# Patient Record
Sex: FEMALE | Race: AMERICAN INDIAN OR ALASKA NATIVE
[De-identification: names, ages, dates, MRNs, and addresses within clinical notes are randomized per-mention and may not be internally consistent; named-entity substitution may affect disease eponyms.]

---

## 2017-05-01 ENCOUNTER — HOSPITAL ENCOUNTER (EMERGENCY)
Dept: HOSPITAL 5 - ED | Age: 42
Discharge: HOME | End: 2017-05-01
Payer: MEDICAID

## 2017-05-01 VITALS — DIASTOLIC BLOOD PRESSURE: 78 MMHG | SYSTOLIC BLOOD PRESSURE: 124 MMHG

## 2017-05-01 DIAGNOSIS — R32: Primary | ICD-10-CM

## 2017-05-01 LAB
ALBUMIN SERPL-MCNC: 3.7 G/DL (ref 3.9–5)
ALBUMIN/GLOB SERPL: 0.8 %
ALP SERPL-CCNC: 89 UNITS/L (ref 35–129)
ALT SERPL-CCNC: 41 UNITS/L (ref 7–56)
ANION GAP SERPL CALC-SCNC: 17 MMOL/L
BASOPHILS NFR BLD AUTO: 0.4 % (ref 0–1.8)
BILIRUB SERPL-MCNC: 0.3 MG/DL (ref 0.1–1.2)
BILIRUB UR QL STRIP: (no result)
BLOOD UR QL VISUAL: (no result)
BUN SERPL-MCNC: 8 MG/DL (ref 7–17)
BUN/CREAT SERPL: 13.33 %
CALCIUM SERPL-MCNC: 9.4 MG/DL (ref 8.4–10.2)
CHLORIDE SERPL-SCNC: 99.5 MMOL/L (ref 98–107)
CO2 SERPL-SCNC: 25 MMOL/L (ref 22–30)
EOSINOPHIL NFR BLD AUTO: 1.6 % (ref 0–4.3)
GLUCOSE SERPL-MCNC: 71 MG/DL (ref 65–100)
HCT VFR BLD CALC: 38.3 % (ref 30.3–42.9)
HGB BLD-MCNC: 12.5 GM/DL (ref 10.1–14.3)
KETONES UR STRIP-MCNC: (no result) MG/DL
LEUKOCYTE ESTERASE UR QL STRIP: (no result)
LIPASE SERPL-CCNC: 30 UNITS/L (ref 13–60)
MCH RBC QN AUTO: 26 PG (ref 28–32)
MCHC RBC AUTO-ENTMCNC: 33 % (ref 30–34)
MCV RBC AUTO: 80 FL (ref 79–97)
NITRITE UR QL STRIP: (no result)
PH UR STRIP: 7 [PH] (ref 5–7)
PLATELET # BLD: 276 K/MM3 (ref 140–440)
POTASSIUM SERPL-SCNC: 4.2 MMOL/L (ref 3.6–5)
PROT SERPL-MCNC: 8.1 G/DL (ref 6.3–8.2)
PROT UR STRIP-MCNC: (no result) MG/DL
RBC # BLD AUTO: 4.76 M/MM3 (ref 3.65–5.03)
RBC #/AREA URNS HPF: < 1 /HPF (ref 0–6)
SODIUM SERPL-SCNC: 137 MMOL/L (ref 137–145)
UROBILINOGEN UR-MCNC: < 2 MG/DL (ref ?–2)
WBC # BLD AUTO: 6.4 K/MM3 (ref 4.5–11)
WBC #/AREA URNS HPF: < 1 /HPF (ref 0–6)

## 2017-05-01 PROCEDURE — 36415 COLL VENOUS BLD VENIPUNCTURE: CPT

## 2017-05-01 PROCEDURE — 96361 HYDRATE IV INFUSION ADD-ON: CPT

## 2017-05-01 PROCEDURE — 96374 THER/PROPH/DIAG INJ IV PUSH: CPT

## 2017-05-01 PROCEDURE — 74176 CT ABD & PELVIS W/O CONTRAST: CPT

## 2017-05-01 PROCEDURE — 81001 URINALYSIS AUTO W/SCOPE: CPT

## 2017-05-01 PROCEDURE — 80053 COMPREHEN METABOLIC PANEL: CPT

## 2017-05-01 PROCEDURE — 83690 ASSAY OF LIPASE: CPT

## 2017-05-01 PROCEDURE — 99284 EMERGENCY DEPT VISIT MOD MDM: CPT

## 2017-05-01 PROCEDURE — 84703 CHORIONIC GONADOTROPIN ASSAY: CPT

## 2017-05-01 PROCEDURE — 51702 INSERT TEMP BLADDER CATH: CPT

## 2017-05-01 PROCEDURE — 85025 COMPLETE CBC W/AUTO DIFF WBC: CPT

## 2017-05-01 PROCEDURE — 87086 URINE CULTURE/COLONY COUNT: CPT

## 2017-05-01 NOTE — EMERGENCY DEPARTMENT REPORT
Entered by JOSE GAO, acting as scribe for MILAN ODONNELL PA.


Chief Complaint: Abdominal Pain


Stated Complaint: DIFFICULTY URINATING,BACK PAIN


Time Seen by Provider: 05/01/17 12:08





- HPI


History of Present Illness: 





40 y/o female presents c/o inability to void since 2 days ago. Sx include fever 

and chills. Pt notes Hx of kidney stones and kidney infection. 





- ROS


Review of Systems: 





as noted in HPI





- Exam


Vital Signs: 


 Vital Signs











  05/01/17





  12:00


 


Temperature 98.2 F


 


Pulse Rate 68


 


Respiratory 16





Rate 


 


Blood Pressure 150/100


 


O2 Sat by Pulse 100





Oximetry 











Physical Exam: 





General: 40 y/o female in no acute distress.  Well-developed, well-nourished.


CV: Regular rate and rhythm.  No murmurs rubs or gallops.


Lungs: Clear to auscultation bilaterally.


Abdomen: abd distended and TTP. No guarding. Normal bowel sounds. 


Mini Neuro: Alert and oriented 3.





MSE screening note: 


Focused history and physical exam performed.


Due to findings the following was ordered:











ED Disposition for MSE


Condition: Stable


Instructions:  Abdominal Pain (ED)





This documentation as recorded by the scribe,JOSE GAO,accurately reflects 

the service I personally performed and the decisions made by me,MILAN ODONNELL PA.

## 2017-05-01 NOTE — EMERGENCY DEPARTMENT REPORT
ED Female  HPI





- General


Chief complaint: Abdominal Pain


Stated complaint: DIFFICULTY URINATING,BACK PAIN


Time Seen by Provider: 17 12:18


Source: patient


Mode of arrival: Ambulatory


Limitations: No Limitations





- History of Present Illness


Initial comments: 





Patient reports inability to urinate for approximately 2 days.  Reports hx of 

kidney stones.  reports that she had similar symptoms with kidney stones


MD Complaint: other (suprapubic pain)


-: Gradual, days(s)


Location: suprapubic


Radiation: non-radiating


Severity: mild


Severity scale (0 -10): 3


Quality: aching


Consistency: constant


Improves with: none


Worsens with: none


Are you Pregnant Now?: No


Associated Symptoms: abdominal pain.  denies: vaginal discharge, vaginal 

bleeding, nausea/vomiting, fever/chills, headaches, loss of appetite, dysuria, 

hematuria, rash, seizure, shortness of breath, syncope, weakness





- Related Data


 Home Medications











 Medication  Instructions  Recorded  Confirmed  Last Taken


 


No Known Home Medications [No  17 Unknown





Reported Home Medications]    











 Allergies











Allergy/AdvReac Type Severity Reaction Status Date / Time


 


No Known Allergies Allergy   Verified 17 15:38














ED Review of Systems


ROS: 


Stated complaint: DIFFICULTY URINATING,BACK PAIN


Other details as noted in HPI





Other: 





GENERAL: No weight change, fatigue, weakness, fever, chills, or night sweats


SKIN: No changes in skin or hair, no itching, no rashes, no jaundice


HEAD: No trauma, headache, or visual changes


EYES: No blurriness, tearing, itching, acute visual loss, conjunctival 

discoloration, or scleral icterus


EARS: No hearing loss,  tinnitus, vertigo, or earache


NOSE: No rhinorrhea, stuffiness, sneezing, itching, or epistaxis


MOUTH: No bleeding gums, hoarseness, sore throat, or swelling


CARDIAC: No new murmur, chest pain, palpitations, dyspnea on exertion, orthopnea

, PND, or edema


RESPIRATORY: No shortness of breath, wheeze, cough, sputum production, 

hemoptysis, pneumonia, asthma, bronchitis, or emphysema


GI: No change in appetite, vomiting,  dysphagia, change in bowel frequency, 

diarrhea, constipation, bleeding, hematemesis, melena, hematochezia


MUSCULOSKELETAL: No muscle weakness, joint stiffness, decrease in range of 

motion, redness, swelling, tenderness


NEUROLOGIC: No loss of sensation, numbness, tingling, tremors, weakness, 

paralysis, seizures


HEMATOLOGIC: No anemia, easy bruising, bleeding, petechiae, or purpura


ENDOCRINE: No hot or cold intolerance, sweating, polyuria, polydipsia or, 

polyphagia no thyroid problems


PSYCHIATRIC: No change in mood, no anxiety, no depression














ED Past Medical Hx





- Past Medical History


Hx Heart Attack/AMI: No


Hx Congestive Heart Failure: No


Hx Diabetes: No


Hx Kidney Stones: Yes


Hx Asthma: No


Hx COPD: No





- Surgical History


Hx Cholecystectomy: Yes


Additional Surgical History: TUBAL LIGATION AND REVERSAL.  .  KIDNEY 

STONE REMOVED





- Social History


Smoking Status: Never Smoker


Substance Use Type: Alcohol





- Medications


Home Medications: 


 Home Medications











 Medication  Instructions  Recorded  Confirmed  Last Taken  Type


 


No Known Home Medications [No  17 Unknown History





Reported Home Medications]     














ED Physical Exam





- General


Limitations: No Limitations





- Other


Other exam information: 





GENERAL: Patient in no acute distress


HEAD: Normocephalic, atraumatic


EYES: PERRLA, EOM intact, no scleral icterus, no papilledema, no conjunctival 

hemorrhage, visual fields and acuity wnl,


NOSE: No tenderness, discharge, sinus tenderness


MOUTH: No erythema, bleeding, exudate


HEART: Regular rate and rhythm, no murmur, S1-S2 are auscultated, pulses are 

symmetric


LUNGS: No wheezing, rales, rhonchi, bilateral breath sounds


ABDOMEN: Normal bowel sounds, no tenderness, no rebound, no guarding, no masses

, no CVA tenderness


MUSCULOSKELETAL: Normal joint range of motion, no redness, no swelling, no 

tenderness


NEUROLOGIC: GCS 15, Alert and Oriented x3, Cranial nerves intact, normal 

sensation, normal strength, normal gait, no cerebellar deficit


PSYCHIATRIC: No homicidal or suicidal ideation, no anxiety, no depression, no 

hallucinations


SKIN: Skin is warm and dry, no wounds, no rashes




















ED Course


 Vital Signs











  17





  12:00 16:37 17:05


 


Temperature 98.2 F  


 


Pulse Rate 68  


 


Respiratory 16 18 18





Rate   


 


Blood Pressure 150/100  


 


Blood Pressure   





[Left]   


 


O2 Sat by Pulse 100  100





Oximetry   














  17





  17:29


 


Temperature 


 


Pulse Rate 68


 


Respiratory 18





Rate 


 


Blood Pressure 


 


Blood Pressure 124/78





[Left] 


 


O2 Sat by Pulse 100





Oximetry 














ED Medical Decision Making





- Lab Data


Result diagrams: 


 17 12:12





 17 12:12





- Radiology Data


Radiology results: report reviewed





- Medical Decision Making





Patient comfortable.  Updated with results.  Plan discharge with outpatient 

follow-up.  Patient agrees with plan and will return if symptoms worsen.


Critical care attestation.: 


If time is entered above; I have spent that time in minutes in the direct care 

of this critically ill patient, excluding procedure time.








ED Disposition


Clinical Impression: 


Urinary bladder incontinence


Qualifiers:


 Urinary Incontinence type: unspecified incontinence Qualified Code(s): R32 - 

Unspecified urinary incontinence





Disposition: DISCHARGED TO HOME OR SELFCARE


Is pt being admited?: No


Condition: Stable


Instructions:  Urinary Incontinence (ED), Abdominal Pain (ED)


Referrals: 


PRIMARY CARE,MD [Primary Care Provider] - 3-5 Days


HELGA BOYD MD [Staff Physician] - ASAP


Forms:  Work/School Release Form(ED)


Time of Disposition: 18:01

## 2017-05-01 NOTE — CAT SCAN REPORT
CT of the abdomen and pelvis without contrast.



History: Flank pain.



Findings: There are 3 stones in the right kidney, the largest of which 

measures 4 mm in diameter. A 3.8 center in diameter cyst is seen in the 

upper pole of the right kidney. A 1 cm cyst is in the lower pole of the 

right kidney. 3 tiny stones measuring 2 mm each are seen in the left 

kidney. There is no evidence of hydronephrosis or ureteral dilatation.



The abdominal viscera are otherwise unremarkable. No abnormal fluid 

collections or mesenteric inflammation is seen.



Impression: Bilateral nephrolithiasis without obstruction.



2. 2 right renal cysts are described.

## 2017-05-12 ENCOUNTER — HOSPITAL ENCOUNTER (EMERGENCY)
Dept: HOSPITAL 5 - ED | Age: 42
Discharge: HOME | End: 2017-05-12
Payer: MEDICAID

## 2017-05-12 VITALS — DIASTOLIC BLOOD PRESSURE: 71 MMHG | SYSTOLIC BLOOD PRESSURE: 118 MMHG

## 2017-05-12 DIAGNOSIS — Z96.89: ICD-10-CM

## 2017-05-12 DIAGNOSIS — R10.32: ICD-10-CM

## 2017-05-12 DIAGNOSIS — E86.0: ICD-10-CM

## 2017-05-12 DIAGNOSIS — G43.909: ICD-10-CM

## 2017-05-12 DIAGNOSIS — N13.2: ICD-10-CM

## 2017-05-12 DIAGNOSIS — R31.9: Primary | ICD-10-CM

## 2017-05-12 LAB
ALBUMIN SERPL-MCNC: 3.7 G/DL (ref 3.9–5)
ALBUMIN/GLOB SERPL: 1 %
ALP SERPL-CCNC: 78 UNITS/L (ref 35–129)
ALT SERPL-CCNC: 25 UNITS/L (ref 7–56)
ANION GAP SERPL CALC-SCNC: 15 MMOL/L
BASOPHILS NFR BLD AUTO: 0.8 % (ref 0–1.8)
BILIRUB SERPL-MCNC: 0.2 MG/DL (ref 0.1–1.2)
BILIRUB UR QL STRIP: (no result)
BLOOD UR QL VISUAL: (no result)
BUN SERPL-MCNC: 13 MG/DL (ref 7–17)
BUN/CREAT SERPL: 18.57 %
CALCIUM SERPL-MCNC: 8.7 MG/DL (ref 8.4–10.2)
CHLORIDE SERPL-SCNC: 102.6 MMOL/L (ref 98–107)
CO2 SERPL-SCNC: 25 MMOL/L (ref 22–30)
EOSINOPHIL NFR BLD AUTO: 0.4 % (ref 0–4.3)
GLUCOSE SERPL-MCNC: 85 MG/DL (ref 65–100)
HCT VFR BLD CALC: 35.9 % (ref 30.3–42.9)
HGB BLD-MCNC: 11.7 GM/DL (ref 10.1–14.3)
KETONES UR STRIP-MCNC: (no result) MG/DL
LEUKOCYTE ESTERASE UR QL STRIP: (no result)
LIPASE SERPL-CCNC: 30 UNITS/L (ref 13–60)
MCH RBC QN AUTO: 27 PG (ref 28–32)
MCHC RBC AUTO-ENTMCNC: 33 % (ref 30–34)
MCV RBC AUTO: 81 FL (ref 79–97)
MUCOUS THREADS #/AREA URNS HPF: (no result) /HPF
NITRITE UR QL STRIP: (no result)
PH UR STRIP: 6 [PH] (ref 5–7)
PLATELET # BLD: 351 K/MM3 (ref 140–440)
POTASSIUM SERPL-SCNC: 4.4 MMOL/L (ref 3.6–5)
PROT SERPL-MCNC: 7.3 G/DL (ref 6.3–8.2)
RBC # BLD AUTO: 4.43 M/MM3 (ref 3.65–5.03)
RBC #/AREA URNS HPF: > 182 /HPF (ref 0–6)
SODIUM SERPL-SCNC: 138 MMOL/L (ref 137–145)
URINE DRUGS OF ABUSE NOTE: (no result)
UROBILINOGEN UR-MCNC: < 2 MG/DL (ref ?–2)
WBC # BLD AUTO: 11.8 K/MM3 (ref 4.5–11)
WBC #/AREA URNS HPF: > 182 /HPF (ref 0–6)

## 2017-05-12 PROCEDURE — 85025 COMPLETE CBC W/AUTO DIFF WBC: CPT

## 2017-05-12 PROCEDURE — 84703 CHORIONIC GONADOTROPIN ASSAY: CPT

## 2017-05-12 PROCEDURE — 96361 HYDRATE IV INFUSION ADD-ON: CPT

## 2017-05-12 PROCEDURE — 36415 COLL VENOUS BLD VENIPUNCTURE: CPT

## 2017-05-12 PROCEDURE — 87086 URINE CULTURE/COLONY COUNT: CPT

## 2017-05-12 PROCEDURE — 51701 INSERT BLADDER CATHETER: CPT

## 2017-05-12 PROCEDURE — 80053 COMPREHEN METABOLIC PANEL: CPT

## 2017-05-12 PROCEDURE — 99284 EMERGENCY DEPT VISIT MOD MDM: CPT

## 2017-05-12 PROCEDURE — 74176 CT ABD & PELVIS W/O CONTRAST: CPT

## 2017-05-12 PROCEDURE — 96375 TX/PRO/DX INJ NEW DRUG ADDON: CPT

## 2017-05-12 PROCEDURE — 81001 URINALYSIS AUTO W/SCOPE: CPT

## 2017-05-12 PROCEDURE — 80307 DRUG TEST PRSMV CHEM ANLYZR: CPT

## 2017-05-12 PROCEDURE — 83690 ASSAY OF LIPASE: CPT

## 2017-05-12 PROCEDURE — 96376 TX/PRO/DX INJ SAME DRUG ADON: CPT

## 2017-05-12 PROCEDURE — 96365 THER/PROPH/DIAG IV INF INIT: CPT

## 2017-05-12 NOTE — EMERGENCY DEPARTMENT REPORT
ED Abdominal Pain HPI





- General


Chief Complaint: Abdominal Pain


Stated Complaint: ABD PAIN


Time Seen by Provider: 17 06:21


Source: patient, EMS


Mode of arrival: Stretcher


Limitations: No Limitations





- History of Present Illness


Initial Comments: 


Patient had a stent placed on 5/10 of 2017.  At 10 AM this morning she noted 

gross hematuria.  She's been having some right flank pain and left lower 

quadrant pain.  She denies fever or chills.  She was given Dilaudid prior to my 

examination for analgesia.  She reported persistent pain.  She has not been 

vomiting.





MD Complaint: abdominal pain


-: Gradual, hour(s)


Location: LLQ


Radiation: none


Migration to: no migration


Severity scale (0 -10): 8


Quality: cramping


Consistency: constant


Improves With: nothing


Worsens With: nothing


Associated Symptoms: denies other symptoms





- Related Data


 Home Medications











 Medication  Instructions  Recorded  Confirmed  Last Taken


 


HYDROcodone/APAP 5-325 [[place] 1 each PO Q6HR PRN 17





5/325]    








 Previous Rx's











 Medication  Instructions  Recorded  Last Taken  Type


 


Cefuroxime [Ceftin] 250 mg PO Q12H #14 tablet 17 Unknown Rx


 


oxyCODONE /ACETAMINOPHEN [Percocet 1 tab PO Q6HR PRN #14 tablet 17 

Unknown Rx





5/325]    











 Allergies











Allergy/AdvReac Type Severity Reaction Status Date / Time


 


No Known Allergies Allergy   Verified 17 15:38














ED Review of Systems


ROS: 


Stated complaint: ABD PAIN


Other details as noted in HPI





Constitutional: denies: chills, fever


Eyes: denies: eye pain, eye discharge, vision change


ENT: denies: ear pain, throat pain


Respiratory: denies: cough, shortness of breath, wheezing


Cardiovascular: denies: chest pain, palpitations


Endocrine: no symptoms reported


Gastrointestinal: as per HPI, abdominal pain.  denies: nausea, diarrhea


Genitourinary: hematuria.  denies: urgency, dysuria, discharge


Musculoskeletal: denies: back pain, joint swelling, arthralgia


Skin: denies: rash, lesions


Neurological: denies: headache, weakness, paresthesias


Psychiatric: denies: anxiety, depression


Hematological/Lymphatic: denies: easy bleeding, easy bruising





ED Past Medical Hx





- Past Medical History


Hx Hypertension: No


Hx Heart Attack/AMI: No


Hx Congestive Heart Failure: No


Hx Diabetes: No


Hx Renal Disease: Yes (kidney stones)


Hx Headaches / Migraines: Yes (MIGRAINES)


Hx Kidney Stones: Yes


Hx Asthma: No


Hx COPD: No


Additional medical history: As far as I can tell there were no stones found on 

retrograde urethrogram prior to left ureteral stent.  There was some edema 

noted.  There are no stones in the left kidney per prior CT examination.





- Surgical History


Hx Cholecystectomy: Yes


Additional Surgical History: TUBAL LIGATION AND REVERSAL.  .  KIDNEY 

STONE REMOVED.  urethral stents





- Social History


Smoking Status: Never Smoker


Substance Use Type: None





- Medications


Home Medications: 


 Home Medications











 Medication  Instructions  Recorded  Confirmed  Last Taken  Type


 


HYDROcodone/APAP 5-325 [[place] 1 each PO Q6HR PRN 17 

History





5/325]     


 


Cefuroxime [Ceftin] 250 mg PO Q12H #14 tablet 17  Unknown Rx


 


oxyCODONE /ACETAMINOPHEN [Percocet 1 tab PO Q6HR PRN #14 tablet 17  

Unknown Rx





5/325]     














ED Physical Exam





- General


Limitations: No Limitations


General appearance: alert, in no apparent distress





- Head


Head exam: Present: atraumatic, normocephalic





- Eye


Eye exam: Present: normal appearance.  Absent: scleral icterus





- ENT


ENT exam: Present: normal exam, mucous membranes moist





- Neck


Neck exam: Present: normal inspection





- Respiratory


Respiratory exam: Present: normal lung sounds bilaterally.  Absent: respiratory 

distress





- Cardiovascular


Cardiovascular Exam: Present: regular rate, normal rhythm.  Absent: systolic 

murmur, diastolic murmur, rubs, gallop





- GI/Abdominal


GI/Abdominal exam: Present: soft, normal bowel sounds.  Absent: distended, 

tenderness, guarding, rebound, rigid





- Extremities Exam


Extremities exam: Present: normal inspection





- Back Exam


Back exam: Present: normal inspection





- Neurological Exam


Neurological exam: Present: alert, oriented X3, CN II-XII intact.  Absent: 

motor sensory deficit





- Psychiatric


Psychiatric exam: Present: normal affect, normal mood





- Skin


Skin exam: Present: warm, dry, intact, normal color.  Absent: rash





ED Course





 Vital Signs











  17





  04:18 04:20 04:27


 


Temperature   98.6 F


 


Pulse Rate  56 L 66


 


Respiratory  16 12





Rate   


 


Blood Pressure  181/85 181/85


 


Blood Pressure   181/85





[Right]   


 


O2 Sat by Pulse 98 100 100





Oximetry   














  17





  04:31 04:41 04:51


 


Temperature   


 


Pulse Rate 60 63 52 L


 


Respiratory 12 16 14





Rate   


 


Blood Pressure 146/86 146/86 134/73


 


Blood Pressure   





[Right]   


 


O2 Sat by Pulse 100 98 99





Oximetry   














  17





  05:00 05:11 05:21


 


Temperature   


 


Pulse Rate 54 L 61 55 L


 


Respiratory 8 L 9 L 19





Rate   


 


Blood Pressure 130/79 130/79 140/83


 


Blood Pressure   





[Right]   


 


O2 Sat by Pulse 99 96 98





Oximetry   














  17





  05:41 05:47 05:51


 


Temperature   


 


Pulse Rate 64 61 61


 


Respiratory  20 8 L





Rate   


 


Blood Pressure   150/67


 


Blood Pressure  151/67 





[Right]   


 


O2 Sat by Pulse 99 98 97





Oximetry   














  17





  06:00 06:11 07:00


 


Temperature   


 


Pulse Rate 142 H 53 L 61


 


Respiratory 33 H 17 13





Rate   


 


Blood Pressure 135/77 135/77 


 


Blood Pressure   122/87





[Right]   


 


O2 Sat by Pulse 96 95 98





Oximetry   














  17





  07:30 08:00 08:45


 


Temperature   


 


Pulse Rate  64 70


 


Respiratory 17 17 14





Rate   


 


Blood Pressure   


 


Blood Pressure  122/61 112/68





[Right]   


 


O2 Sat by Pulse  95 98





Oximetry   














  17





  08:52 09:22 09:40


 


Temperature   


 


Pulse Rate   65


 


Respiratory 14 12 15





Rate   


 


Blood Pressure   


 


Blood Pressure   124/73





[Right]   


 


O2 Sat by Pulse   95





Oximetry   














- Reevaluation(s)


Reevaluation #1: 


Patient's pain improved with additional Dilaudid.  However did not resolve.  

Gave the patient ceftriaxone and culture the urine.  I spoke to . he 

recommended that the patient come to the office for possible stent removal.  I 

will give the patient a prescription for Ceftin.


17 10:10








ED Medical Decision Making





- Lab Data


Result diagrams: 


 17 04:29





 17 04:29








 Laboratory Results - last 24 hr











  17





  04:29 04:29 04:29


 


WBC  11.8 H  


 


RBC  4.43  


 


Hgb  11.7  


 


Hct  35.9  


 


MCV  81  


 


MCH  27 L  


 


MCHC  33  


 


RDW  17.0 H  


 


Plt Count  351  


 


Lymph % (Auto)  30.6  


 


Mono % (Auto)  7.3  


 


Eos % (Auto)  0.4  


 


Baso % (Auto)  0.8  


 


Lymph #  3.6  


 


Mono #  0.9 H  


 


Eos #  0.0  


 


Baso #  0.1  


 


Seg Neutrophils %  60.9  


 


Seg Neutrophils #  7.2  


 


Sodium   138 


 


Potassium   4.4 


 


Chloride   102.6 


 


Carbon Dioxide   25 


 


Anion Gap   15 


 


BUN   13 


 


Creatinine   0.7 


 


Estimated GFR   > 60 


 


BUN/Creatinine Ratio   18.57 


 


Glucose   85 


 


Calcium   8.7 


 


Total Bilirubin   0.20 


 


AST   36 


 


ALT   25 


 


Alkaline Phosphatase   78 


 


Total Protein   7.3 


 


Albumin   3.7 L 


 


Albumin/Globulin Ratio   1.0 


 


Lipase   30 


 


HCG, Qual    Negative


 


Urine Color   


 


Urine Turbidity   


 


Urine pH   


 


Ur Specific Gravity   


 


Urine Protein   


 


Urine Glucose (UA)   


 


Urine Ketones   


 


Urine Blood   


 


Urine Nitrite   


 


Urine Bilirubin   


 


Urine Urobilinogen   


 


Ur Leukocyte Esterase   


 


Urine WBC (Auto)   


 


Urine RBC (Auto)   


 


U Epithel Cells (Auto)   


 


Urine WBC Clumps   


 


Urine Mucus   


 


Urine Opiates Screen   


 


Urine Methadone Screen   


 


Ur Barbiturates Screen   


 


Ur Phencyclidine Scrn   


 


Ur Amphetamines Screen   


 


U Benzodiazepines Scrn   


 


Urine Cocaine Screen   


 


U Marijuana (THC) Screen   


 


Drugs of Abuse Note   














  17





  06:42 06:42


 


WBC  


 


RBC  


 


Hgb  


 


Hct  


 


MCV  


 


MCH  


 


MCHC  


 


RDW  


 


Plt Count  


 


Lymph % (Auto)  


 


Mono % (Auto)  


 


Eos % (Auto)  


 


Baso % (Auto)  


 


Lymph #  


 


Mono #  


 


Eos #  


 


Baso #  


 


Seg Neutrophils %  


 


Seg Neutrophils #  


 


Sodium  


 


Potassium  


 


Chloride  


 


Carbon Dioxide  


 


Anion Gap  


 


BUN  


 


Creatinine  


 


Estimated GFR  


 


BUN/Creatinine Ratio  


 


Glucose  


 


Calcium  


 


Total Bilirubin  


 


AST  


 


ALT  


 


Alkaline Phosphatase  


 


Total Protein  


 


Albumin  


 


Albumin/Globulin Ratio  


 


Lipase  


 


HCG, Qual  


 


Urine Color  Red 


 


Urine Turbidity  Cloudy 


 


Urine pH  6.0 


 


Ur Specific Gravity  1.017 


 


Urine Protein  100 mg/dl 


 


Urine Glucose (UA)  Neg 


 


Urine Ketones  Neg 


 


Urine Blood  Lg 


 


Urine Nitrite  Neg 


 


Urine Bilirubin  Neg 


 


Urine Urobilinogen  < 2.0 


 


Ur Leukocyte Esterase  Mod 


 


Urine WBC (Auto)  > 182.0 H 


 


Urine RBC (Auto)  > 182.0 


 


U Epithel Cells (Auto)  7.0 


 


Urine WBC Clumps  3+ 


 


Urine Mucus  Few 


 


Urine Opiates Screen   Presumptive positive


 


Urine Methadone Screen   Presumptive negative


 


Ur Barbiturates Screen   Presumptive negative


 


Ur Phencyclidine Scrn   Presumptive negative


 


Ur Amphetamines Screen   Presumptive negative


 


U Benzodiazepines Scrn   Presumptive negative


 


Urine Cocaine Screen   Presumptive negative


 


U Marijuana (THC) Screen   Presumptive negative


 


Drugs of Abuse Note   Disclamer











Critical care attestation.: 


If time is entered above; I have spent that time in minutes in the direct care 

of this critically ill patient, excluding procedure time.








ED Disposition


Clinical Impression: 


 History of ureter stent





UTI (urinary tract infection)


Qualifiers:


 Urinary tract infection type: site unspecified Hematuria presence: with 

hematuria Qualified Code(s): N39.0 - Urinary tract infection, site not specified

; R31.9 - Hematuria, unspecified





Abdominal pain


Qualifiers:


 Abdominal location: left lower quadrant Qualified Code(s): R10.32 - Left lower 

quadrant pain





Disposition: DISCHARGED TO HOME OR SELFCARE


Is pt being admited?: No


Does the pt Need Aspirin: No


Condition: Stable


Instructions:  Abdominal Pain (ED), Urinary Tract Infection in Women (ED)


Additional Instructions: 


Dr. PLAZA will be expecting you in his office.  He may go there directly.  Rx for 

UTI.  Follow-up on urine culture.


Prescriptions: 


Cefuroxime [Ceftin] 250 mg PO Q12H #14 tablet


oxyCODONE /ACETAMINOPHEN [Percocet 5/325] 1 tab PO Q6HR PRN #14 tablet


 PRN Reason: Pain


Referrals: 


PRIMARY CARE,MD [Primary Care Provider] - 3-5 Days


HELGA BOYD MD [Staff Physician] - ASAP


Time of Disposition: 10:14

## 2017-05-12 NOTE — CAT SCAN REPORT
FINAL REPORT



EXAM:  CT ABDOMEN PELVIS WO CON



HISTORY:  abd pain, urethral stent insertion 2 days ago 



TECHNIQUE:  Noncontrast CT of the abdomen and pelvis performed. 

No IV or gastrointestinal contrast was administered. Coronal and

sagittal reformatted images were obtained.





PRIORS:  5/1/2017



FINDINGS:  

There are several small nonobstructing right intrarenal calculi.

There is a left ureteral stent extending from renal pelvis to the

bladder. Despite the presence of a ureteral stent, there is mild

left hydronephrosis. There is a small gas bubble within the left

kidney/collecting system which is probably postprocedural. 



There is an unchanged 3.8 cm renal cyst. 



There is no abdominal aortic aneurysm.

There is no evidence of intestinal obstruction.

The appendix is normal.

There is no free intraperitoneal air.





The bladder is unremarkable.

There is no abnormal pelvic mass or fluid collections seen. 



IMPRESSION:  

There is a left ureteral stent. Despite presence of the stent,

there is mild left hydronephrosis. Small gas bubble in the left

kidney is probably postprocedural.



Several small nonobstructing right intrarenal calculi seen.

## 2020-01-07 ENCOUNTER — HOSPITAL ENCOUNTER (OUTPATIENT)
Dept: HOSPITAL 5 - ED | Age: 45
Setting detail: OBSERVATION
LOS: 1 days | Discharge: HOME | End: 2020-01-08
Attending: INTERNAL MEDICINE | Admitting: INTERNAL MEDICINE
Payer: SELF-PAY

## 2020-01-07 DIAGNOSIS — Z87.442: ICD-10-CM

## 2020-01-07 DIAGNOSIS — I69.351: ICD-10-CM

## 2020-01-07 DIAGNOSIS — G43.809: Primary | ICD-10-CM

## 2020-01-07 DIAGNOSIS — E66.9: ICD-10-CM

## 2020-01-07 DIAGNOSIS — Z79.899: ICD-10-CM

## 2020-01-07 DIAGNOSIS — Z79.82: ICD-10-CM

## 2020-01-07 LAB
APTT BLD: 30.2 SEC. (ref 24.2–36.6)
BASOPHILS # (AUTO): 0 K/MM3 (ref 0–0.1)
BASOPHILS NFR BLD AUTO: 0.4 % (ref 0–1.8)
BUN SERPL-MCNC: 12 MG/DL (ref 7–17)
BUN/CREAT SERPL: 17 %
CALCIUM SERPL-MCNC: 8.8 MG/DL (ref 8.4–10.2)
EOSINOPHIL # BLD AUTO: 0.1 K/MM3 (ref 0–0.4)
EOSINOPHIL NFR BLD AUTO: 1.2 % (ref 0–4.3)
HCT VFR BLD CALC: 37.8 % (ref 30.3–42.9)
HEMOLYSIS INDEX: 5
HGB BLD-MCNC: 12.6 GM/DL (ref 10.1–14.3)
INR PPP: 0.98 (ref 0.87–1.13)
LYMPHOCYTES # BLD AUTO: 2.6 K/MM3 (ref 1.2–5.4)
LYMPHOCYTES NFR BLD AUTO: 34.3 % (ref 13.4–35)
MCHC RBC AUTO-ENTMCNC: 33 % (ref 30–34)
MCV RBC AUTO: 86 FL (ref 79–97)
MONOCYTES # (AUTO): 0.7 K/MM3 (ref 0–0.8)
MONOCYTES % (AUTO): 9.6 % (ref 0–7.3)
PLATELET # BLD: 252 K/MM3 (ref 140–440)
RBC # BLD AUTO: 4.4 M/MM3 (ref 3.65–5.03)

## 2020-01-07 PROCEDURE — 84484 ASSAY OF TROPONIN QUANT: CPT

## 2020-01-07 PROCEDURE — 93005 ELECTROCARDIOGRAM TRACING: CPT

## 2020-01-07 PROCEDURE — 99291 CRITICAL CARE FIRST HOUR: CPT

## 2020-01-07 PROCEDURE — 93306 TTE W/DOPPLER COMPLETE: CPT

## 2020-01-07 PROCEDURE — 70551 MRI BRAIN STEM W/O DYE: CPT

## 2020-01-07 PROCEDURE — 85025 COMPLETE CBC W/AUTO DIFF WBC: CPT

## 2020-01-07 PROCEDURE — 70498 CT ANGIOGRAPHY NECK: CPT

## 2020-01-07 PROCEDURE — 97161 PT EVAL LOW COMPLEX 20 MIN: CPT

## 2020-01-07 PROCEDURE — G0378 HOSPITAL OBSERVATION PER HR: HCPCS

## 2020-01-07 PROCEDURE — 70496 CT ANGIOGRAPHY HEAD: CPT

## 2020-01-07 PROCEDURE — 70450 CT HEAD/BRAIN W/O DYE: CPT

## 2020-01-07 PROCEDURE — 80061 LIPID PANEL: CPT

## 2020-01-07 PROCEDURE — 80048 BASIC METABOLIC PNL TOTAL CA: CPT

## 2020-01-07 PROCEDURE — 96376 TX/PRO/DX INJ SAME DRUG ADON: CPT

## 2020-01-07 PROCEDURE — 85610 PROTHROMBIN TIME: CPT

## 2020-01-07 PROCEDURE — 97165 OT EVAL LOW COMPLEX 30 MIN: CPT

## 2020-01-07 PROCEDURE — 36415 COLL VENOUS BLD VENIPUNCTURE: CPT

## 2020-01-07 PROCEDURE — 93880 EXTRACRANIAL BILAT STUDY: CPT

## 2020-01-07 PROCEDURE — 93010 ELECTROCARDIOGRAM REPORT: CPT

## 2020-01-07 PROCEDURE — 85670 THROMBIN TIME PLASMA: CPT

## 2020-01-07 PROCEDURE — 96374 THER/PROPH/DIAG INJ IV PUSH: CPT

## 2020-01-07 PROCEDURE — 96375 TX/PRO/DX INJ NEW DRUG ADDON: CPT

## 2020-01-07 PROCEDURE — 85730 THROMBOPLASTIN TIME PARTIAL: CPT

## 2020-01-07 PROCEDURE — 82962 GLUCOSE BLOOD TEST: CPT

## 2020-01-07 RX ADMIN — ACETAMINOPHEN PRN MG: 325 TABLET ORAL at 22:12

## 2020-01-07 NOTE — CAT SCAN REPORT
CT angio head



INDICATION / CLINICAL INFORMATION:

44 years Female; r/o stroke.



TECHNIQUE: Thin cut axial images obtained through the head during IV bolus contrast administration. S
agittal, coronal, and 3 plane MIP reconstructions performed by the technologist. NASCET type criteria
 used evaluate stenoses. Automated exposure control utilized for radiation reduction purposes.



COMPARISON: 

None available.



FINDINGS:



INTERNAL CAROTID ARTERIES: No significant narrowing appreciated.

VERTEBROBASILAR SYSTEM: No significant narrowing appreciated. Mild narrowing of the distal, nondomina
nt right vertebral artery suggested.



DISTAL BRANCHES: Distal branches of the anterior, middle, and posterior cerebral arteries are fairly 
symmetric in appearance and number. There may be mild narrowing in the P1 segment on the left-not fel
t to be significant.



ANEURYSM: None identified.



ADDITIONAL FINDINGS: Remainder of the surrounding soft tissues are grossly normal. 



IMPRESSION:

No significant stenosis appreciated on this CTA of the head.



Signer Name: Juancho Gustafson MD, III 

Signed: 1/7/2020 8:27 PM

 Work[facility] Name: [id number]

## 2020-01-07 NOTE — HISTORY AND PHYSICAL REPORT
History of Present Illness


Chief complaint: 





I feel we, right side


History of present illness: 


44-year-old female with migraine headache, nephrolithiasis, presents to ED for 

evaluation.  Patient was in her usual state of health around 1 AM this morning 

and subsequently went to work.  Patient states that she felt an acute onset of 

right arm and leg weakness, as well as difficulty speaking.  Patient is unsure 

of the onset of her symptoms.  Patient states that she finished her shift at 

work and proceeded to [facility] for further care and evaluation.  Patient transported 

to [facility] via private vehicle.  Patient seen and evaluated in the emergency 

department and upon arrival a code stroke was called.  Neurology team consulted.

 Tele-neurology consulted as well.  Patient deemed not a candidate for TPA.  

Patient placed in observation status and admitted to medical floor for medical 

stabilization and evaluation.  Patient initiated on stroke protocol.  Prior 

admission on 2/18/2017 reviewed.  No medication listed for reconciliation at the

time of admission.  Patient denies fever, chills, chest pain, palpitations, 

shortness of breath, bright red blood per rectum, skin rash, fall, syncope, 

productive cough, unilateral leg swelling, calf pain, prolonged travel, 

prolonged immobility, unilateral leg swelling, hemoptysis, individual/family 

history of DVT/PE/bleeding disorders/blood clotting disorder.





Past History


Past Medical History: migraines


Past Surgical History: No surgical history, Other (Reviewed)


Social history: single.  denies: smoking, alcohol abuse, prescription drug abuse


Family history: diabetes, hypertension





Medications and Allergies


                                    Allergies











Allergy/AdvReac Type Severity Reaction Status Date / Time


 


No Known Allergies Allergy   Verified 02/17/17 15:38











                                Home Medications











 Medication  Instructions  Recorded  Confirmed  Last Taken  Type


 


HYDROcodone/APAP 5-325 [[place] 1 each PO Q6HR PRN 05/04/17 05/12/17 05/12/17 

History





5/325]     


 


cefUROXime [Ceftin] 250 mg PO Q12H #14 tablet 05/12/17  Unknown Rx


 


oxyCODONE /ACETAMINOPHEN [Percocet 1 tab PO Q6HR PRN #14 tablet 05/12/17  

Unknown Rx





5/325]     











Active Meds: 


Active Medications





Acetaminophen (Tylenol)  650 mg PO Q4H PRN


   PRN Reason: Pain, Mild (1-3)


Aspirin (Aspirin)  325 mg PO QDAY AI


Atorvastatin Calcium (Lipitor)  40 mg PO QHS AI


Bisacodyl (Dulcolax)  10 mg MN QDAY PRN


   PRN Reason: Constipation


Magnesium Hydroxide (Milk Of Magnesia)  30 ml PO Q4H PRN


   PRN Reason: Constipation


Metoclopramide HCl (Reglan)  10 mg PO Q6H PRN


   PRN Reason: Nausea And Vomiting


Ondansetron HCl (Zofran)  4 mg IV Q8H PRN


   PRN Reason: Nausea And Vomiting


Promethazine HCl (Phenergan)  25 mg MN Q6H PRN


   PRN Reason: Nausea And Vomiting


Sodium Chloride (Sodium Chloride Flush Syringe 10 Ml)  10 ml IV PRN PRN


   PRN Reason: LINE FLUSH











Review of Systems


Constitutional: no weight loss, no weight gain, no chills, no night sweats


Ears, nose, mouth and throat: no ear discharge, no decreased hearing, no nose 

pain, no nasal congestion


Breasts: no mass


Cardiovascular: no chest pain, no orthopnea, no palpitations, no rapid/irregular

heart beat


Respiratory: no cough, no excessive sputum, no hemoptysis, no shortness of 

breath


Gastrointestinal: no nausea, no vomiting, no constipation


Genitourinary Female: no pelvic pain, no flank pain, no menorrhagia, no urgency,

no urge incontinence


Rectal: no pain, no incontinence, no bleeding


Musculoskeletal: no neck stiffness, no shooting arm pain, no arm numb

ness/tingling, no low back pain, no shooting leg pain, no leg numbness/tingling


Integumentary: no rash, no pruritis, no redness, no sores, no wounds


Neurological: weakness, numbness, lack of coordination, change in speech, no 

transient paralysis, no changes in smell/taste


Psychiatric: no anxiety, no memory loss, no sleep disturbances, no hypersomnia, 

no change in appetite, no change in libido


Endocrine: no cold intolerance, no polyphagia, no polydipsia, no polyuria


Hematologic/Lymphatic: no easy bruising, no lymphedema


Allergic/Immunologic: no urticaria, no allergic rhinitis, no anaphylaxis, no 

angioedema





Exam





- Constitutional


Vitals: 


                                        











Temp Pulse Resp BP Pulse Ox


 


    59 L  29 H  135/74   99 


 


    01/07/20 20:59  01/07/20 20:59  01/07/20 20:59  01/07/20 20:59











General appearance: Present: mild distress





- EENT


Eyes: Present: PERRL


ENT: hearing intact, clear oral mucosa





- Neck


Neck: Present: supple, normal ROM





- Respiratory


Respiratory effort: normal


Respiratory: bilateral: CTA





- Cardiovascular


Heart Sounds: Present: S1 & S2.  Absent: rub, click





- Extremities


Extremities: pulses symmetrical, No edema


Peripheral Pulses: within normal limits





- Abdominal


General gastrointestinal: Present: soft, non-tender, non-distended, normal bowel

sounds


Female genitourinary: Present: normal





- Integumentary


Integumentary: Present: clear, warm, dry





- Musculoskeletal


Musculoskeletal: right sided weakness





- Psychiatric


Psychiatric: appropriate mood/affect, intact judgment & insight





- Neurologic


Neurologic: CNII-XII intact, focal deficits, moves all extremities, no gait 

normal





Results





- Labs


CBC & Chem 7: 


                                 01/07/20 18:58





                                 01/07/20 18:58


Labs: 


                              Abnormal lab results











  01/07/20 01/07/20 Range/Units





  18:58 18:58 


 


Mono % (Auto)  9.6 H   (0.0-7.3)  %


 


Glucose   104 H  ()  mg/dL














Assessment and Plan





- Patient Problems


(1) CVA (cerebral vascular accident)


Status: Acute   


Qualifiers: 


   Precerebral and cerebral artery: middle cerebral artery   Laterality of 

affected vessel: left 


Plan to address problem: 


Stroke protocol: CT head, neurochecks, aspiration precautions, lipid panel, 

antiplatelet therapy, neurology consulted, carotid Doppler, echocardiogram, 

physical therapy, Occupational Therapy, speech therapy,








(2) Right hemiparesis


Status: Acute   


Plan to address problem: 


Physical therapy consulted.








(3) Obesity (BMI 30.0-34.9)


Status: Acute   


Plan to address problem: 


Balanced diet, increase physical activity at discharge








(4) DVT prophylaxis


Status: Acute   


Plan to address problem: 


SCD to bilateral lower extremity while in bed, patient ambulatory.

## 2020-01-07 NOTE — PROGRESS NOTE
Subjective


Date of service: 01/07/20


Interval history: 


patient seen in the CT scanner at time of the stroke alert  exam shows right 

sided sensory loss mild facial numbness and slight speech slowing  no gross 

motor weakness course of events from work til seen in ED a little bit hard to 

follw as she may have passed out  but no direct witnesses  at present appears 

grossly intat transfers on of stretcher very well personal review of the CT of 

head is normal  there is hx of headaches so migraine is possible but she is not 

on meds   recommend get ECHO if there is idea cardioembolic  hx is not well 

stated   w/u in ptogress  reviewed all notes








Objective





- Laboratory Findings


CBC and BMP: 


                                 01/07/20 18:58





Abnormal Lab Findings: 


                                  Abnormal Labs











  01/07/20





  18:58


 


Mono % (Auto)  9.6 H

## 2020-01-07 NOTE — HISTORY AND PHYSICAL REPORT
History of Present Illness


Date of examination: 01/07/20


Date of admission: 


01/07/2020


Chief complaint: 


R side numbness





Medications and Allergies


                                    Allergies











Allergy/AdvReac Type Severity Reaction Status Date / Time


 


No Known Allergies Allergy   Verified 02/17/17 15:38











                                Home Medications











 Medication  Instructions  Recorded  Confirmed  Last Taken  Type


 


HYDROcodone/APAP 5-325 [[place] 1 each PO Q6HR PRN 05/04/17 05/12/17 05/12/17 

History





5/325]     


 


cefUROXime [Ceftin] 250 mg PO Q12H #14 tablet 05/12/17  Unknown Rx


 


oxyCODONE /ACETAMINOPHEN [Percocet 1 tab PO Q6HR PRN #14 tablet 05/12/17  

Unknown Rx





5/325]     














Exam





- Physical Exam


Narrative exam: 


- General


Limitations: No Limitations


General appearance: alert, in mild distress





- Head


Head exam: Present: normocephalic, normal inspection





- Eye


Eye exam: Present: normal appearance, PERRL





- ENT


ENT exam: Present:  mucous membranes moist





- Neck


Neck exam: Present: normal inspection,





- Respiratory


Respiratory exam: Present: normal lung sounds bilaterally





- Cardiovascular


Cardiovascular Exam: Present: regular rate, normal rhythm, normal heart sounds





- GI/Abdominal


GI/Abdominal exam: Present: soft, normal bowel sounds. 





- Extremities Exam


Extremities exam: Present: normal inspection, normal capillary refill.  

+numbness, R leg Drift


- Neurological Exam


Neurological exam: Present: alert, oriented X3, CN II-XII intact








- Constitutional


Vitals: 


                                        











Temp Pulse Resp BP Pulse Ox


 


    59 L  14   147/63   98 


 


    01/07/20 20:00  01/07/20 20:00  01/07/20 20:00  01/07/20 20:00














Results





- Labs


CBC & Chem 7: 


                                 01/07/20 18:58





                                 01/07/20 18:58


Labs: 


                             Laboratory Last Values











WBC  7.7 K/mm3 (4.5-11.0)   01/07/20  18:58    


 


RBC  4.40 M/mm3 (3.65-5.03)   01/07/20  18:58    


 


Hgb  12.6 gm/dl (10.1-14.3)   01/07/20  18:58    


 


Hct  37.8 % (30.3-42.9)   01/07/20  18:58    


 


MCV  86 fl (79-97)   01/07/20  18:58    


 


MCH  29 pg (28-32)   01/07/20  18:58    


 


MCHC  33 % (30-34)   01/07/20  18:58    


 


RDW  15.1 % (13.2-15.2)   01/07/20  18:58    


 


Plt Count  252 K/mm3 (140-440)   01/07/20  18:58    


 


Lymph % (Auto)  34.3 % (13.4-35.0)   01/07/20  18:58    


 


Mono % (Auto)  9.6 % (0.0-7.3)  H  01/07/20  18:58    


 


Eos % (Auto)  1.2 % (0.0-4.3)   01/07/20  18:58    


 


Baso % (Auto)  0.4 % (0.0-1.8)   01/07/20  18:58    


 


Lymph #  2.6 K/mm3 (1.2-5.4)   01/07/20  18:58    


 


Mono #  0.7 K/mm3 (0.0-0.8)   01/07/20  18:58    


 


Eos #  0.1 K/mm3 (0.0-0.4)   01/07/20  18:58    


 


Baso #  0.0 K/mm3 (0.0-0.1)   01/07/20  18:58    


 


Seg Neutrophils %  54.5 % (40.0-70.0)   01/07/20  18:58    


 


Seg Neutrophils #  4.2 K/mm3 (1.8-7.7)   01/07/20  18:58    


 


PT  13.1 Sec. (12.2-14.9)   01/07/20  18:58    


 


INR  0.98  (0.87-1.13)   01/07/20  18:58    


 


APTT  30.2 Sec. (24.2-36.6)   01/07/20  18:58    


 


Thrombin Time  16.1 Sec. (15.1-19.6)   01/07/20  18:58    


 


Sodium  138 mmol/L (137-145)   01/07/20  18:58    


 


Potassium  4.1 mmol/L (3.6-5.0)   01/07/20  18:58    


 


Chloride  102.6 mmol/L ()   01/07/20  18:58    


 


Carbon Dioxide  23 mmol/L (22-30)   01/07/20  18:58    


 


Anion Gap  17 mmol/L  01/07/20  18:58    


 


BUN  12 mg/dL (7-17)   01/07/20  18:58    


 


Creatinine  0.7 mg/dL (0.7-1.2)   01/07/20  18:58    


 


Estimated GFR  > 60 ml/min  01/07/20  18:58    


 


BUN/Creatinine Ratio  17 %  01/07/20  18:58    


 


Glucose  104 mg/dL ()  H  01/07/20  18:58    


 


POC Glucose  91  ()   01/07/20  18:48    


 


Calcium  8.8 mg/dL (8.4-10.2)   01/07/20  18:58    


 


Troponin T  < 0.010 ng/mL (0.00-0.029)   01/07/20  18:58    














- Imaging and Cardiology


EKG: report reviewed (Sinus Rhythm)


CT Scan - head: report reviewed (There is no CT evidence of acute intracranial 

process)


Imaging and Cardiology: 


No significant stenosis appreciated on CTA of the head.





No significant stenosis appreciated on this CTA of the neck.

## 2020-01-07 NOTE — EMERGENCY DEPARTMENT REPORT
ED Neuro Deficit HPI





- General


Chief Complaint: Neuro Symptoms/Deficit


Stated Complaint: BLURRED VISION/RT SIDE NUMB


Time Seen by Provider: 20 18:49


Source: patient


Mode of arrival: Ambulatory


Limitations: No Limitations





- History of Present Illness


Initial Comments: 





Patient is 44 years old female with no significant past medical history except 

for remote history of kidney stone.  Patient presented to the ER by her own 

private vehicle stating that she is having difficulty speaking clearly, weakness

to the right upper and lower extremity associated with numbness.  Patient stated

that she does not know exactly what was the time when symptoms started but she 

stated that started at work.  She stated that she worked at night and to finish 

at 07 a.m. she stating that her symptoms most likely started around 1 AM this 

morning.  She stated that her co-worker told her she was unresponsive for a 

while.





Stroke protocol immediately initiated.  Patient immediately moved to CT scan.  

Stroke tele neurologist immediately consulted.


Location: speech, dysarthria, right arm, right leg


Presenting Symptoms: Present: Weak/Paralyzed One Side, Unable to Speak Clearly


History of same: No


Place: work


Context: sudden onset


Associated Symptoms: denies other symptoms





- Related Data


Home Medications: 


                                Home Medications











 Medication  Instructions  Recorded  Confirmed  Last Taken


 


HYDROcodone/APAP 5-325 [[place] 1 each PO Q6HR PRN 17





5/325]    








                                  Previous Rx's











 Medication  Instructions  Recorded  Last Taken  Type


 


cefUROXime [Ceftin] 250 mg PO Q12H #14 tablet 17 Unknown Rx


 


oxyCODONE /ACETAMINOPHEN [Percocet 1 tab PO Q6HR PRN #14 tablet 17 Unknown

 Rx





5/325]    











Allergies/Adverse Reactions: 


                                    Allergies











Allergy/AdvReac Type Severity Reaction Status Date / Time


 


No Known Allergies Allergy   Verified 17 15:38














ED Review of Systems


ROS: 


Stated complaint: BLURRED VISION/RT SIDE NUMB


Other details as noted in HPI





Comment: All other systems reviewed and negative


Constitutional: denies: chills, fever


Respiratory: denies: cough, shortness of breath, SOB with exertion


Cardiovascular: denies: chest pain, palpitations


Gastrointestinal: denies: abdominal pain, nausea, vomiting


Neurological: weakness, numbness.  denies: headache, paresthesias, confusion





ED Past Medical Hx





- Past Medical History


Hx Hypertension: No


Hx Heart Attack/AMI: No


Hx Congestive Heart Failure: No


Hx Diabetes: No


Hx Renal Disease: Yes (kidney stones)


Hx Headaches / Migraines: Yes (MIGRAINES)


Hx Kidney Stones: Yes


Hx Asthma: No


Hx COPD: No


Additional medical history: As far as I can tell there were no stones found on 

retrograde urethrogram prior to left ureteral stent.  There was some edema 

noted.  There are no stones in the left kidney per prior CT examination.





- Surgical History


Hx Cholecystectomy: Yes


Additional Surgical History: TUBAL LIGATION AND REVERSAL.  .  KIDNEY 

STONE REMOVED.  urethral stents





- Social History


Smoking Status: Never Smoker


Substance Use Type: None





- Medications


Home Medications: 


                                Home Medications











 Medication  Instructions  Recorded  Confirmed  Last Taken  Type


 


HYDROcodone/APAP 5-325 [[place] 1 each PO Q6HR PRN 17 

History





5/325]     


 


cefUROXime [Ceftin] 250 mg PO Q12H #14 tablet 17  Unknown Rx


 


oxyCODONE /ACETAMINOPHEN [Percocet 1 tab PO Q6HR PRN #14 tablet 17  

Unknown Rx





5/325]     














ED Neuro Physical Exam





- General


Limitations: No Limitations


General appearance: alert, in no apparent distress


Suspected Stroke: Yes





- Head


Head exam: Present: atraumatic, normocephalic, normal inspection





- Eye


Eye exam: Present: normal appearance, PERRL





- ENT


ENT exam: Present: normal exam, normal orophraynx, mucous membranes moist





- Neck


Neck exam: Present: normal inspection, full ROM.  Absent: tenderness, 

meningismus, lymphadenopathy, thyromegaly





- Respiratory


Respiratory exam: Present: normal lung sounds bilaterally





- Cardiovascular


Cardiovascular Exam: Present: regular rate, normal rhythm, normal heart sounds





- GI/Abdominal


GI/Abdominal exam: Present: soft, normal bowel sounds.  Absent: distended, 

tenderness, guarding, rebound, rigid, organomegaly, mass, bruit, pulsatile mass,

hernia





- Extremities Exam


Extremities exam: Present: normal inspection, full ROM, normal capillary refill.

 Absent: pedal edema, calf tenderness





- Back Exam


Back exam: Present: normal inspection, full ROM.  Absent: CVA tenderness (R), 

CVA tenderness (L), muscle spasm, paraspinal tenderness, vertebral tenderness, 

rash noted





- Neurological Exam


Neurological exam: Present: alert, oriented X3, CN II-XII intact





- NIHSS


Assessment Interval: Baseline


1a. Level of Consciousness: alert/keenly responsive


1b. LOC Questions: answers both correctly


1c. LOC Commands: performs tasks correctly


2. Best Gaze: normal


3. Visual: no visual loss


4. Facial Palsy: normal symmetrical movement


5b. Motor Arm Right: drift


5a. Motor Arm Left: no drift


6a. Motor Leg Left: no drift


6b. Motor Leg Right: drift


7. Limb Ataxia: absent


8. Sensory: mild/moderate sensory loss


9. Best Language: mild/moderate aphasia


10. Dysarthria: mild/moderate dysarthria


11. Extinction/Inattention: no abnormality


Total Score: 5


Stroke Severity: Moderate Stroke





- Psychiatric


Psychiatric exam: Present: normal mood





- Skin


Skin exam: Present: warm, intact, normal color





ED Course


                                   Vital Signs











  20





  19:43 19:50 19:51


 


Pulse Rate  66 66


 


Respiratory 16 13 13





Rate   


 


Blood Pressure   134/69


 


Blood Pressure  134/69 





[Right]   


 


O2 Sat by Pulse 98 98 98





Oximetry   














  20





  19:54 20:00 20:59


 


Pulse Rate 63 59 L 59 L


 


Respiratory  14 29 H





Rate   


 


Blood Pressure   


 


Blood Pressure  147/63 135/74





[Right]   


 


O2 Sat by Pulse  98 99





Oximetry   














- Lab Data


Result diagrams: 


                                 20 18:58





                                 20 18:58


                                   Lab Results











  20 Range/Units





  18:48 18:58 18:58 


 


WBC   7.7   (4.5-11.0)  K/mm3


 


RBC   4.40   (3.65-5.03)  M/mm3


 


Hgb   12.6   (10.1-14.3)  gm/dl


 


Hct   37.8   (30.3-42.9)  %


 


MCV   86   (79-97)  fl


 


MCH   29   (28-32)  pg


 


MCHC   33   (30-34)  %


 


RDW   15.1   (13.2-15.2)  %


 


Plt Count   252   (140-440)  K/mm3


 


Lymph % (Auto)   34.3   (13.4-35.0)  %


 


Mono % (Auto)   9.6 H   (0.0-7.3)  %


 


Eos % (Auto)   1.2   (0.0-4.3)  %


 


Baso % (Auto)   0.4   (0.0-1.8)  %


 


Lymph #   2.6   (1.2-5.4)  K/mm3


 


Mono #   0.7   (0.0-0.8)  K/mm3


 


Eos #   0.1   (0.0-0.4)  K/mm3


 


Baso #   0.0   (0.0-0.1)  K/mm3


 


Seg Neutrophils %   54.5   (40.0-70.0)  %


 


Seg Neutrophils #   4.2   (1.8-7.7)  K/mm3


 


PT    13.1  (12.2-14.9)  Sec.


 


INR    0.98  (0.87-1.13)  


 


APTT    30.2  (24.2-36.6)  Sec.


 


Thrombin Time     (15.1-19.6)  Sec.


 


Sodium     (137-145)  mmol/L


 


Potassium     (3.6-5.0)  mmol/L


 


Chloride     ()  mmol/L


 


Carbon Dioxide     (22-30)  mmol/L


 


Anion Gap     mmol/L


 


BUN     (7-17)  mg/dL


 


Creatinine     (0.7-1.2)  mg/dL


 


Estimated GFR     ml/min


 


BUN/Creatinine Ratio     %


 


Glucose     ()  mg/dL


 


POC Glucose  91    ()  


 


Calcium     (8.4-10.2)  mg/dL


 


Troponin T     (0.00-0.029)  ng/mL














  20 Range/Units





  18:58 18:58 


 


WBC    (4.5-11.0)  K/mm3


 


RBC    (3.65-5.03)  M/mm3


 


Hgb    (10.1-14.3)  gm/dl


 


Hct    (30.3-42.9)  %


 


MCV    (79-97)  fl


 


MCH    (28-32)  pg


 


MCHC    (30-34)  %


 


RDW    (13.2-15.2)  %


 


Plt Count    (140-440)  K/mm3


 


Lymph % (Auto)    (13.4-35.0)  %


 


Mono % (Auto)    (0.0-7.3)  %


 


Eos % (Auto)    (0.0-4.3)  %


 


Baso % (Auto)    (0.0-1.8)  %


 


Lymph #    (1.2-5.4)  K/mm3


 


Mono #    (0.0-0.8)  K/mm3


 


Eos #    (0.0-0.4)  K/mm3


 


Baso #    (0.0-0.1)  K/mm3


 


Seg Neutrophils %    (40.0-70.0)  %


 


Seg Neutrophils #    (1.8-7.7)  K/mm3


 


PT    (12.2-14.9)  Sec.


 


INR    (0.87-1.13)  


 


APTT    (24.2-36.6)  Sec.


 


Thrombin Time   16.1  (15.1-19.6)  Sec.


 


Sodium  138   (137-145)  mmol/L


 


Potassium  4.1   (3.6-5.0)  mmol/L


 


Chloride  102.6   ()  mmol/L


 


Carbon Dioxide  23   (22-30)  mmol/L


 


Anion Gap  17   mmol/L


 


BUN  12   (7-17)  mg/dL


 


Creatinine  0.7   (0.7-1.2)  mg/dL


 


Estimated GFR  > 60   ml/min


 


BUN/Creatinine Ratio  17   %


 


Glucose  104 H   ()  mg/dL


 


POC Glucose    ()  


 


Calcium  8.8   (8.4-10.2)  mg/dL


 


Troponin T  < 0.010   (0.00-0.029)  ng/mL














- EKG Data


-: EKG Interpreted by Me


EKG shows normal: sinus rhythm


Rate: normal


Interpretation: no acute changes





- Radiology Data


Radiology results: report reviewed





- Medical Decision Making





Patient is 44 years old female with no significant past medical history except 

for remote history of kidney stone.  Patient presented to the ER by her own 

private vehicle stating that she is having difficulty speaking clearly, weakness

 to the right upper and lower extremity associated with numbness.  Patient 

stated that she does not know exactly what was the time when symptoms started 

but she stated that started at work.  She stated that she worked at night and to

 finish at 07 a.m. she stating that her symptoms most likely started around 1 AM

 this morning.  She stated that her co-worker told her she was unresponsive for 

a while.





Stroke protocol immediately initiated.  Patient immediately moved to CT scan.  

Stroke tele neurologist immediately consulted.





Dr. Raya, neurologist, he examined the patient and advised patient is not a TPA 

candidate.  CTA of brain and neck obtained showed no significant stenosis.  I 

discussed the patient with Dr. Saleem, he agreed to admit the patient to medical 

service for further management.


Critical Care Time: Yes


Critical care time in (mins) excluding proc time.: 30


Critical care attestation.: 


If time is entered above; I have spent that time in minutes in the direct care 

of this critically ill patient, excluding procedure time.








ED Disposition


Clinical Impression: 


 CVA (cerebral vascular accident)





Disposition: [id number] OP ADMIT IP TO THIS HOSP


Is pt being admited?: Yes


Condition: Stable

## 2020-01-07 NOTE — CAT SCAN REPORT
CT angio neck



INDICATION / CLINICAL INFORMATION:

44 years Female; r/o stroke.



TECHNIQUE: Thin cut axial images obtained through the head during IV bolus contrast administration. S
agittal, coronal, and 3 plane MIP reconstructions performed by the technologist. NASCET type criteria
 used evaluate stenoses. All CT scans at this location are performed using CT dose reduction for ALAR
A by means of automated exposure control.



COMPARISON:

None available.



FINDINGS: 



ARCH: Normal aortic arch branching suggested.



CAROTID ARTERIES: The visualized common and internal carotid arteries are widely patent.



VERTEBRAL ARTERIES: Codominant vertebral system seen. No significant stenosis appreciated.



ADDITIONAL FINDINGS: Sinus disease noted. 



IMPRESSION: No significant stenosis appreciated on this CTA of the neck.



Signer Name: Juancho Gustafson MD, III 

Signed: 1/7/2020 8:30 PM

 Workstation Name: [id number]

## 2020-01-07 NOTE — CONSULTATION
History of Present Illness


History of present illness: 





[facility]


[facility]








Date of Service:   01/07/2020 18:41:29





Impression:


      RO Acute Ischemic Stroke





Comments:


1. Cardioembolic stroke 2. Small vessel disease/lacune 3. Thromboembolic, 

artery-to-artery mechanism 4. Hypercoagulable state-related infarct 5. 

Thrombotic mechanism, large artery disease 6. Transient ischemic attack





Metrics:


Last Known Well: Unknown


[facility] Notification Time: 01/07/2020 18:40:55


Arrival Time: 01/07/2020 18:32:00


Stamp Time: 01/07/2020 18:41:29


Time First Login Attempt: 01/07/2020 18:52:00


Video Start Time: 01/07/2020 18:52:00





Symptoms: numbness


NIHSS Start Assessment Time: 01/07/2020 18:57:25


Patient is not a candidate for tPA.


Patient was not deemed candidate for tPA thrombolytics because of Last Well 

Known Above 4.5 Hours.


Video End Time: 01/07/2020 19:04:53





CT head was reviewed.





Advanced imaging CTA head and neck obtained.








Radiologist was not called back for review of advanced imaging because imaging 

pending


ER Physician notified of the decision on thrombolytics management on 01/07/2020 

19:04:14





Our recommendations are outlined below.





Recommendations:


      Activate Stroke Protocol Admission/Order Set


      Stroke/Telemetry Floor


      Neuro Checks


      Bedside Swallow Eval


      DVT Prophylaxis


      IV Fluids, Normal Saline


      Head of Bed Below 30 Degrees


      Euglycemia and Avoid Hyperthermia (PRN Acetaminophen)


      ASA





Recommended Scan:


     MRI Head Without Contrast





Lipid Panel to Be Obtained, if Not Done in the Last Three Months





Therapies:


      Physical Therapy, Occupational Therapy, Speech Therapy Assessment When 

Applicable





Dysphaghia Screen:


      Swallow Evaluation, Bedside


      NPO Until Swallow Evaluation





DVT prophylaxis:


      Choice of Primary Team





Disposition:


      Follow up with Teleneurology Follow up





Sign Out:


      Discussed with Emergency Department Provider











------------------------------------------------------------------------------





History of Present Illness:


Patient is a 44 year old Female.





Patient was brought by private transportation with symptoms of numbness





45 y/o woman presents to the ED with headache and right sided numbness and 

difficulty speaking. Symptoms started sometime this morning, but she cannot 

recall when it started. Emergent telestroke consult requested. CT head reviewed 

and case discussed with ED staff. CTA head/neck pending





CT head was reviewed.








Examination:


1A: Level of Consciousness - Alert; keenly responsive + 0


1B: Ask Month and Age - 1 Question Right + 1


1C: Blink Eyes & Squeeze Hands - Performs Both Tasks + 0


2: Test Horizontal Extraocular Movements - Normal + 0


3: Test Visual Fields - No Visual Loss + 0


4: Test Facial Palsy (Use Grimace if Obtunded) - Normal symmetry + 0


5A: Test Left Arm Motor Drift - No Drift for 10 Seconds + 0


5B: Test Right Arm Motor Drift - Drift, but doesn't hit bed + 1


6A: Test Left Leg Motor Drift - No Drift for 5 Seconds + 0


6B: Test Right Leg Motor Drift - Drift, but doesn't hit bed + 1


7: Test Limb Ataxia (FNF/Heel-Shin) - No Ataxia + 0


8: Test Sensation - Mild-Moderate Loss: Less Sharp/More Dull + 1


9: Test Language/Aphasia - Mild-Moderate Aphasia: Some Obvious Changes, Without 

Significant Limitation + 1


10: Test Dysarthria - Normal + 0


11: Test Extinction/Inattention - No abnormality + 0





NIHSS Score: 5





Patient was informed the Neurology Consult would happen via TeleHealth consult 

by way of interactive audio and video telecommunications and consented to 

receiving care in this manner.





Due to the immediate potential for life-threatening deterioration due to 

underlying acute neurologic illness, I spent 15 minutes providing critical care.

This time includes time for face to face visit via telemedicine, review of 

medical records, imaging studies and discussion of findings with providers, the 

patient and/or family.








Dr Samuel Raya








[facility]


[phone] 





Case [id number]





Medications and Allergies


                                    Allergies











Allergy/AdvReac Type Severity Reaction Status Date / Time


 


No Known Allergies Allergy   Verified 02/17/17 15:38











                                Home Medications











 Medication  Instructions  Recorded  Confirmed  Last Taken  Type


 


HYDROcodone/APAP 5-325 [[place] 1 each PO Q6HR PRN 05/04/17 05/12/17 05/12/17 

History





5/325]     


 


cefUROXime [Ceftin] 250 mg PO Q12H #14 tablet 05/12/17  Unknown Rx


 


oxyCODONE /ACETAMINOPHEN [Percocet 1 tab PO Q6HR PRN #14 tablet 05/12/17  

Unknown Rx





5/325]

## 2020-01-07 NOTE — CAT SCAN REPORT
CT head/brain wo con



INDICATION / CLINICAL INFORMATION:

44 years Female; neuro deficits <6hrs or sx present upon awakening. 



TECHNIQUE: Routine CT head without contrast. All CT scans at this location are performed using CT dos
e reduction for ALARA by means of automated exposure control. 



COMPARISON: 

None.



FINDINGS:



BRAIN / INTRACRANIAL CONTENTS: The brain demonstrates appropriate attenuation. The ventricular system
 is within normal limits in size and configuration. There is no clear CT evidence of acute intracrani
al hemorrhage or significant mass effect.



ORBITS: No significant abnormality of visualized orbits.

SINUSES / MASTOIDS: There is mild focal opacification along the visualized lateral left maxillary sin
us at.



CRANIOCERVICAL JUNCTION: No significant abnormality.

ADDITIONAL FINDINGS: None. 



IMPRESSION:

1. There is no CT evidence of acute intracranial process.



The study was specified as code stroke and called on the emergent basis to Dr. Jimenez in the ER at 5
:59 PM Central standard time.



Signer Name: Gold Ann MD 

Signed: 1/7/2020 7:02 PM

 Workstation Name: [id number]

## 2020-01-08 VITALS — SYSTOLIC BLOOD PRESSURE: 115 MMHG | DIASTOLIC BLOOD PRESSURE: 71 MMHG

## 2020-01-08 LAB — HDLC SERPL-MCNC: 43 MG/DL (ref 40–59)

## 2020-01-08 RX ADMIN — ACETAMINOPHEN PRN MG: 325 TABLET ORAL at 14:51

## 2020-01-08 NOTE — VASCULAR LAB REPORT
BILATERAL CAROTID DOPPLER ULTRASOUND



INDICATION : stroke



TECHNIQUE:  Grayscale and color Doppler imaging performed through the neck.



COMPARISON:  None



FINDINGS:



Right:   There is no significant atherosclerotic disease. Peak systolic velocity in the CCA is 109 cm
/s with end-diastolic velocity of 25 cm/s. Peak systolic velocity in the proximal ICA is 92 cm/s with
 end-diastolic velocity of 36 cm/s. ICA to CCA ratio is less than 2. There is antegrade  flow in the 
ECA and the vertebral artery. 



Left: There is no significant atherosclerotic disease. Peak systolic velocity in the CCA is 97 cm/s w
ith end-diastolic velocity of 27 cm/s. Peak systolic velocity in the proximal ICA is 88 cm/s with end
-diastolic velocity of 31 cm/s. ICA to CCA ratio is less than 2. There is antegrade  flow in the ECA 
and the vertebral artery. 



IMPRESSION: No hemodynamically significant stenosis by NASCET criteria. There is less than 50% lumina
l narrowing throughout both carotid systems.



Signer Name: Alex Siddiqui Jr, MD 

Signed: 1/8/2020 9:08 AM

 Workstation Name: [id number]

## 2020-01-08 NOTE — CONSULTATION
History of Present Illness


Consult date: 01/08/20


Requesting physician: BRIAN QUIROS


Reason for Consult: poss cva


Chief complaint: 





slurred speech and right sided weakness





History of present illness: 








44F w hx of migraines, arrives w acute onset of slurred speech, speech changes 

and right sided weakness, no clear onset time, arrived and tele neuro decided no

tpa(outside window no clear LTKW), CTA h/n reviewed and agree, no concern for 

LVO, no a.fib on tele, no recurrent strokes while here, started on antiplt, CT 

head reviewed and neg., NIHSS ext at ~ 5 





today Pt states she does have a HA, L temp area, pressure non throb, no photo, 

but +nausea no vomiting, 6/10





still c/o R sided numb and weak, unchanged


no new neuro complaints, no new focal brain complaints





no vertigo, no change of vision 





'when i lift my head, my HA is worse and I feel worse' 





no prior strokes 


no prior MS





no loc, or sz





denies ill drug use





chart reviewed





no cortical deficits


no dv


+ n/t on the Right face arm leg





no sob cp





no coag. issues on labs


CMP CBC reviewed








no neuro decline


tele : stable , sinus








no pain w eye movements


no loss of visual acuity 








BP on arrival were normotensive for the most part 





Past History


Past Medical History: migraines


Past Surgical History: No surgical history, Other (Reviewed)


Social history: single.  denies: smoking, alcohol abuse, prescription drug abuse


Family history: diabetes, hypertension





Medications and Allergies


                                    Allergies











Allergy/AdvReac Type Severity Reaction Status Date / Time


 


No Known Allergies Allergy   Verified 01/08/20 09:04











                                Home Medications











 Medication  Instructions  Recorded  Confirmed  Last Taken  Type


 


No Known Home Medications [No  01/08/20 01/08/20 Unknown History





Reported Home Medications]     











Active Meds: 


Active Medications





Acetaminophen (Tylenol)  650 mg PO Q4H PRN


   PRN Reason: Pain, Mild (1-3)


   Last Admin: 01/07/20 22:12 Dose:  650 mg


   Documented by: 


Aspirin (Aspirin)  325 mg PO QDAY AI


Atorvastatin Calcium (Lipitor)  40 mg PO QHS AI


   Last Admin: 01/07/20 22:12 Dose:  40 mg


   Documented by: 


Bisacodyl (Dulcolax)  10 mg CT QDAY PRN


   PRN Reason: Constipation


Magnesium Hydroxide (Milk Of Magnesia)  30 ml PO Q4H PRN


   PRN Reason: Constipation


Metoclopramide HCl (Reglan)  10 mg PO Q6H PRN


   PRN Reason: Nausea And Vomiting


Ondansetron HCl (Zofran)  4 mg IV Q8H PRN


   PRN Reason: Nausea And Vomiting


   Last Admin: 01/08/20 05:30 Dose:  4 mg


   Documented by: 


Promethazine HCl (Phenergan)  25 mg CT Q6H PRN


   PRN Reason: Nausea And Vomiting


Sodium Chloride (Sodium Chloride Flush Syringe 10 Ml)  10 ml IV PRN PRN


   PRN Reason: LINE FLUSH











Physical Examination





- Vital Signs


Vital Signs: 


                                   Vital Signs











Resp Pulse Ox


 


 16   98 


 


 01/07/20 19:43  01/07/20 19:43








[id number] no aphasia , no dysarthria 


no signs of cortical deficits





CN 2-12 intact, except decreased LT sensation R Face [id number]


4/5 strength R UE LE w R UE pronator drift


5/5 L UE LE 





sensory: R UE LE decreased to LT diffuse





coordination : intact


tone symm


dtr symm





no extra movements


neck supple





no pain w eye movements


no loss of visual acuity 





abd soft


skin intact


pulses good x 4


no asymm edema





no d/c nose ears


tongue midline





VFF , no dv














Results





- Laboratory Findings


CBC and BMP: 


                                 01/07/20 18:58





                                 01/07/20 18:58


Abnormal Lab Findings: 


                                  Abnormal Labs











  01/07/20 01/07/20





  18:58 18:58


 


Mono % (Auto)  9.6 H 


 


Glucose   104 H














Assessment and Plan





Right sided weakness and sensory loss w HA, in pt w no prior hx of stroke but 

does have migraine d/o, arriving w poss. complex migraine HA exacerbation vs 

stroke, unstable


if stroke the likely subcortical small vessel thrombus off L MCA distribution 

acute isch. 


but I feel this is a migraine exacerbation 





CT head and CTA h/n all reassuring 


anti plt on board





no recurrent stroke sx


no a.fib





no neuro decline





cont. antiplt and statin 


ha1c and lipids


cont. tele 





HA: analgesics , avoid triptans in pts w complex migraines 





MRI b if + then ECHO





OT PT


NPO until cleared to swallow 





check UDS


check HCG

## 2020-01-08 NOTE — MAGNETIC RESONANCE REPORT
MRI BRAIN 1/8/2020



INDICATION / CLINICAL INFORMATION:

possible cva. Right-sided numbness. Headaches.



TECHNIQUE: 

Multiplanar, multisequence MR images of the brain were obtained.



COMPARISON: 

CT brain 1/7/2020



FINDINGS:



BRAIN / INTRACRANIAL CONTENTS: Unenhanced MR images of the brain demonstrate no evidence of acute int
racranial abnormality.



Ventricles and sulci are normal in size and shape.



There is no evidence of acute ischemic injury, hemorrhage, or mass.



A few nonspecific subcortical white matter T2 weighted hyperintensities are present in the high front
al lobes bilaterally. Generally considered these to be of unlikely clinical significance in this sett
ing.



There are no abnormal extra-axial fluid collections.



 



EXTRACRANIAL: Unremarkable



CRANIOCERVICAL JUNCTION: No significant abnormality.



VASCULAR FLOW-VOIDS: No significant abnormality.









IMPRESSION:

 No acute abnormality. 



Signer Name: Tony Leach MD 

Signed: 1/8/2020 5:37 PM

 Workstation Name: [id number]

## 2020-01-08 NOTE — DISCHARGE SUMMARY
Providers





- Providers


Date of Admission: 


01/07/20 20:49





Date of discharge: 01/08/20


Attending physician: 


BRIAN QUIROS





                                        





01/07/20 20:49


Occupational Therapy Evaluate and Treat [CONS] Routine 


   Comment: 


   Reason For Exam: Neuro deficits


Physical Therapy Evaluation and Treat [CONS] Routine 


   Comment: 


   Reason For Exam: Neuro deficits





01/07/20 20:51


Consult to Physician [CONS] Routine 


   Comment: 


   Consulting Provider: OFE TREVINO


   Physician Instructions: 


   Reason For Exam: stroke











Primary care physician: 


PRIMARY CARE MD








Hospitalization


Condition: Stable


Hospital course: 





Discharge diagnosis:


Complex migraine


Obesity (BMI 30.0-34.9)











Time spent for discharge: 34 minutes





Core Measure Documentation





- Palliative Care


Palliative Care/ Comfort Measures: Not Applicable





- Core Measures


Any of the following diagnoses?: none





Exam





- Constitutional


Vitals: 


                                        











Temp Pulse Resp BP Pulse Ox


 


 98.6 F   59 L  18   115/71   94 


 


 01/08/20 11:20  01/08/20 11:20  01/08/20 14:17  01/08/20 11:20  01/08/20 11:20











General appearance: Present: no acute distress, obese





- EENT


Eyes: Present: PERRL


ENT: hearing intact, clear oral mucosa





- Neck


Neck: Present: supple, normal ROM





- Respiratory


Respiratory effort: normal


Respiratory: bilateral: CTA





- Cardiovascular


Heart Sounds: Present: S1 & S2.  Absent: rub, click





- Extremities


Extremities: pulses symmetrical, No edema


Peripheral Pulses: within normal limits





- Abdominal


General gastrointestinal: Present: soft, non-tender, non-distended, normal bowel

 sounds





- Integumentary


Integumentary: Present: clear, warm, dry





- Musculoskeletal


Musculoskeletal: gait normal, strength equal bilaterally





- Psychiatric


Psychiatric: appropriate mood/affect, intact judgment & insight





- Neurologic


Neurologic: CNII-XII intact, other (right hand numbness)





Plan


Activity: advance as tolerated


Weight Bearing Status: Weight Bear as Tolerated


Diet: low fat, low salt


Follow up with: 


PRIMARY CARE,MD [Primary Care Provider] - 7 Days


KANE LAGOS MD [Staff Physician] - 7 Days


Prescriptions: 


Butalb/Acetaminophen/Caffeine [Fioricet -40 mg CAP] 1 cap PO Q6HR PRN #10 

cap


 PRN Reason: Headache

## 2020-09-11 ENCOUNTER — HOSPITAL ENCOUNTER (EMERGENCY)
Dept: HOSPITAL 5 - ED | Age: 45
Discharge: HOME | End: 2020-09-11
Payer: COMMERCIAL

## 2020-09-11 VITALS — SYSTOLIC BLOOD PRESSURE: 137 MMHG | DIASTOLIC BLOOD PRESSURE: 84 MMHG

## 2020-09-11 DIAGNOSIS — Z87.442: ICD-10-CM

## 2020-09-11 DIAGNOSIS — Z98.51: ICD-10-CM

## 2020-09-11 DIAGNOSIS — Z79.899: ICD-10-CM

## 2020-09-11 DIAGNOSIS — Z98.890: ICD-10-CM

## 2020-09-11 DIAGNOSIS — M54.6: ICD-10-CM

## 2020-09-11 DIAGNOSIS — R09.1: Primary | ICD-10-CM

## 2020-09-11 DIAGNOSIS — Z90.49: ICD-10-CM

## 2020-09-11 DIAGNOSIS — G43.909: ICD-10-CM

## 2020-09-11 LAB
ALBUMIN SERPL-MCNC: 3.7 G/DL (ref 3.9–5)
ALT SERPL-CCNC: 16 UNITS/L (ref 7–56)
BASOPHILS # (AUTO): 0 K/MM3 (ref 0–0.1)
BASOPHILS NFR BLD AUTO: 0.5 % (ref 0–1.8)
BILIRUB UR QL STRIP: (no result)
BLOOD UR QL VISUAL: (no result)
BUN SERPL-MCNC: 12 MG/DL (ref 7–17)
BUN/CREAT SERPL: 20 %
CALCIUM SERPL-MCNC: 9.3 MG/DL (ref 8.4–10.2)
CRP SERPL-MCNC: 0.3 MG/DL (ref 0–1.3)
EOSINOPHIL # BLD AUTO: 0.1 K/MM3 (ref 0–0.4)
EOSINOPHIL NFR BLD AUTO: 0.9 % (ref 0–4.3)
HCT VFR BLD CALC: 38.2 % (ref 30.3–42.9)
HEMOLYSIS INDEX: 29
HGB BLD-MCNC: 12.8 GM/DL (ref 10.1–14.3)
LYMPHOCYTES # BLD AUTO: 2 K/MM3 (ref 1.2–5.4)
LYMPHOCYTES NFR BLD AUTO: 35.7 % (ref 13.4–35)
MCHC RBC AUTO-ENTMCNC: 34 % (ref 30–34)
MCV RBC AUTO: 87 FL (ref 79–97)
MONOCYTES # (AUTO): 0.5 K/MM3 (ref 0–0.8)
MONOCYTES % (AUTO): 8.7 % (ref 0–7.3)
MUCOUS THREADS #/AREA URNS HPF: (no result) /HPF
PH UR STRIP: 6 [PH] (ref 5–7)
PLATELET # BLD: 241 K/MM3 (ref 140–440)
PROT UR STRIP-MCNC: (no result) MG/DL
RBC # BLD AUTO: 4.38 M/MM3 (ref 3.65–5.03)
RBC #/AREA URNS HPF: 1 /HPF (ref 0–6)
UROBILINOGEN UR-MCNC: < 2 MG/DL (ref ?–2)
WBC #/AREA URNS HPF: 2 /HPF (ref 0–6)

## 2020-09-11 PROCEDURE — 84484 ASSAY OF TROPONIN QUANT: CPT

## 2020-09-11 PROCEDURE — 71275 CT ANGIOGRAPHY CHEST: CPT

## 2020-09-11 PROCEDURE — 85379 FIBRIN DEGRADATION QUANT: CPT

## 2020-09-11 PROCEDURE — 85025 COMPLETE CBC W/AUTO DIFF WBC: CPT

## 2020-09-11 PROCEDURE — 81025 URINE PREGNANCY TEST: CPT

## 2020-09-11 PROCEDURE — 86140 C-REACTIVE PROTEIN: CPT

## 2020-09-11 PROCEDURE — 80053 COMPREHEN METABOLIC PANEL: CPT

## 2020-09-11 PROCEDURE — 81001 URINALYSIS AUTO W/SCOPE: CPT

## 2020-09-11 PROCEDURE — 36415 COLL VENOUS BLD VENIPUNCTURE: CPT

## 2020-09-11 PROCEDURE — 96372 THER/PROPH/DIAG INJ SC/IM: CPT

## 2020-09-11 PROCEDURE — 84703 CHORIONIC GONADOTROPIN ASSAY: CPT

## 2020-09-11 PROCEDURE — 99284 EMERGENCY DEPT VISIT MOD MDM: CPT

## 2020-09-11 PROCEDURE — 93005 ELECTROCARDIOGRAM TRACING: CPT

## 2020-09-11 NOTE — EMERGENCY DEPARTMENT REPORT
ED General Adult HPI





- General


Chief complaint: Abdominal Pain


Stated complaint: RT SIDE PAINS


Time Seen by Provider: 20 11:10


Source: patient


Mode of arrival: Ambulatory


Limitations: No Limitations





- History of Present Illness


Initial comments: 





Patient is a 44-year-old female presents emergency room with complaints of right

upper back pain that began 5 days ago.  Patient states that she feels 

significant pain when she takes a deep breath in.  States that when she lays on 

that side the pain increases.  She states that occasionally she does feel short 

of breath.  She denies any fall or injury.  She denies any chest pain, fever, 

nausea, vomiting, diarrhea, leg swelling.  She denies any recent travel, recent 

surgery, immobilization, hormone use.  She has a past medical history of 

hypertension, migraines, nephrolithiasis.  No allergies to medications.





- Related Data


                                Home Medications











 Medication  Instructions  Recorded  Confirmed  Last Taken


 


Aspirin [Aspirin BABY CHEW TAB] 81 mg PO DAILY 20 1 Day Ago





    ~20


 


Atorvastatin [Lipitor Tab] 40 mg PO DAILY 20 1 Day Ago





    ~20


 


Losartan Potassium 50 mg PO DAILY 20 1 Day Ago





    ~20








                                  Previous Rx's











 Medication  Instructions  Recorded  Last Taken  Type


 


Butalb/Acetaminophen/Caffeine 1 cap PO Q6HR PRN #10 cap 20 1 Day Ago Rx





[Fioricet -40 mg CAP]   ~20 


 


Naproxen [EC-Naproxen] 500 mg PO BID PRN #20 tablet. 20 Unknown Rx


 


Prednisone [predniSONE 10 mg 10 mg PO .TAPER #1 tab.ds.pk 20 Unknown Rx





(6-Day Pack, 21 Tabs)]    


 


methOCARBAMOL [Robaxin TAB] 500 mg PO BID PRN #14 tab 20 Unknown Rx











                                    Allergies











Allergy/AdvReac Type Severity Reaction Status Date / Time


 


No Known Allergies Allergy   Verified 20 09:47














ED Review of Systems


ROS: 


Stated complaint: RT SIDE PAINS


Other details as noted in HPI





Comment: All other systems reviewed and negative





ED Past Medical Hx





- Past Medical History


Hx Hypertension: No


Hx Heart Attack/AMI: No


Hx Congestive Heart Failure: No


Hx Diabetes: No


Hx Renal Disease: Yes (kidney stones)


Hx Headaches / Migraines: Yes (MIGRAINES)


Hx Kidney Stones: Yes


Hx Asthma: No


Hx COPD: No


Additional medical history: As far as I can tell there were no stones found on 

retrograde urethrogram prior to left ureteral stent.  There was some edema 

noted.  There are no stones in the left kidney per prior CT examination.





- Surgical History


Hx Cholecystectomy: Yes


Additional Surgical History: TUBAL LIGATION AND REVERSAL.  .  KIDNEY 

STONE REMOVED.  urethral stents





- Social History


Smoking Status: Never Smoker


Substance Use Type: None





- Medications


Home Medications: 


                                Home Medications











 Medication  Instructions  Recorded  Confirmed  Last Taken  Type


 


Butalb/Acetaminophen/Caffeine 1 cap PO Q6HR PRN #10 cap 20 1 Day 

Ago Rx





[Fioricet -40 mg CAP]    ~20 


 


Aspirin [Aspirin BABY CHEW TAB] 81 mg PO DAILY 20 1 Day Ago 

History





    ~20 


 


Atorvastatin [Lipitor Tab] 40 mg PO DAILY 20 1 Day Ago History





    ~20 


 


Losartan Potassium 50 mg PO DAILY 20 1 Day Ago History





    ~20 


 


Naproxen [EC-Naproxen] 500 mg PO BID PRN #20 tablet.dr 20  Unknown Rx


 


Prednisone [predniSONE 10 mg 10 mg PO .TAPER #1 tab.ds.pk 20  Unknown Rx





(6-Day Pack, 21 Tabs)]     


 


methOCARBAMOL [Robaxin TAB] 500 mg PO BID PRN #14 tab 20  Unknown Rx














ED Physical Exam





- General


Limitations: No Limitations


General appearance: alert, in no apparent distress





- Head


Head exam: Present: atraumatic, normocephalic





- Eye


Eye exam: Present: normal appearance





- ENT


ENT exam: Present: mucous membranes moist





- Neck


Neck exam: Present: normal inspection, full ROM.  Absent: tenderness





- Respiratory


Respiratory exam: Present: normal lung sounds bilaterally.  Absent: respiratory 

distress, wheezes, rales, rhonchi, stridor, chest wall tenderness, accessory 

muscle use, decreased breath sounds, prolonged expiratory





- Cardiovascular


Cardiovascular Exam: Present: regular rate, normal rhythm, normal heart sounds. 

Absent: systolic murmur, diastolic murmur, rubs, gallop





- Back Exam


Back exam: Present: normal inspection, full ROM.  Absent: CVA tenderness (R), 

CVA tenderness (L), paraspinal tenderness, vertebral tenderness





- Neurological Exam


Neurological exam: Present: alert, oriented X3, CN II-XII intact, normal gait.  

Absent: motor sensory deficit





- Psychiatric


Psychiatric exam: Present: normal affect, normal mood





- Skin


Skin exam: Present: warm, dry, intact





ED Course


                                   Vital Signs











  20





  09:50 16:50 16:56


 


Temperature 98.2 F  


 


Pulse Rate 73  61


 


Respiratory 20 18 





Rate   


 


Blood Pressure 148/92  137/84


 


O2 Sat by Pulse 98  99





Oximetry   














  20





  17:10 17:24


 


Temperature  


 


Pulse Rate  


 


Respiratory 18 18





Rate  


 


Blood Pressure  


 


O2 Sat by Pulse 99 





Oximetry  














ED Medical Decision Making





- Lab Data


Result diagrams: 


                                 20 12:10





                                 20 12:10








                                   Lab Results











  20 Range/Units





  10:01 12:10 12:10 


 


WBC   5.5   (4.5-11.0)  K/mm3


 


RBC   4.38   (3.65-5.03)  M/mm3


 


Hgb   12.8   (10.1-14.3)  gm/dl


 


Hct   38.2   (30.3-42.9)  %


 


MCV   87   (79-97)  fl


 


MCH   29   (28-32)  pg


 


MCHC   34   (30-34)  %


 


RDW   15.0   (13.2-15.2)  %


 


Plt Count   241   (140-440)  K/mm3


 


Lymph % (Auto)   35.7 H   (13.4-35.0)  %


 


Mono % (Auto)   8.7 H   (0.0-7.3)  %


 


Eos % (Auto)   0.9   (0.0-4.3)  %


 


Baso % (Auto)   0.5   (0.0-1.8)  %


 


Lymph #   2.0   (1.2-5.4)  K/mm3


 


Mono #   0.5   (0.0-0.8)  K/mm3


 


Eos #   0.1   (0.0-0.4)  K/mm3


 


Baso #   0.0   (0.0-0.1)  K/mm3


 


Seg Neutrophils %   54.2   (40.0-70.0)  %


 


Seg Neutrophils #   3.0   (1.8-7.7)  K/mm3


 


D-Dimer    165.30  (0-234)  ng/mlDDU


 


Sodium     (137-145)  mmol/L


 


Potassium     (3.6-5.0)  mmol/L


 


Chloride     ()  mmol/L


 


Carbon Dioxide     (22-30)  mmol/L


 


Anion Gap     mmol/L


 


BUN     (7-17)  mg/dL


 


Creatinine     (0.6-1.2)  mg/dL


 


Estimated GFR     ml/min


 


BUN/Creatinine Ratio     %


 


Glucose     ()  mg/dL


 


Calcium     (8.4-10.2)  mg/dL


 


Total Bilirubin     (0.1-1.2)  mg/dL


 


AST     (5-40)  units/L


 


ALT     (7-56)  units/L


 


Alkaline Phosphatase     ()  units/L


 


Troponin T     (0.00-0.029)  ng/mL


 


C-Reactive Protein     (0.00-1.30)  mg/dL


 


Total Protein     (6.3-8.2)  g/dL


 


Albumin     (3.9-5)  g/dL


 


Albumin/Globulin Ratio     %


 


HCG, Qual     (Negative)  


 


Urine Color  Yellow    (Yellow)  


 


Urine Turbidity  Clear    (Clear)  


 


Urine pH  6.0    (5.0-7.0)  


 


Ur Specific Gravity  1.017    (1.003-1.030)  


 


Urine Protein  <15 mg/dl    (Negative)  mg/dL


 


Urine Glucose (UA)  Neg    (Negative)  mg/dL


 


Urine Ketones  Neg    (Negative)  mg/dL


 


Urine Blood  Neg    (Negative)  


 


Urine Nitrite  Neg    (Negative)  


 


Urine Bilirubin  Neg    (Negative)  


 


Urine Urobilinogen  < 2.0    (<2.0)  mg/dL


 


Ur Leukocyte Esterase  Neg    (Negative)  


 


Urine WBC (Auto)  2.0    (0.0-6.0)  /HPF


 


Urine RBC (Auto)  1.0    (0.0-6.0)  /HPF


 


U Epithel Cells (Auto)  5.0    (0-13.0)  /HPF


 


Urine Mucus  3+    /HPF


 


Urine HCG, Qual  Negative    (Negative)  














  20 Range/Units





  12:10 12:10 


 


WBC    (4.5-11.0)  K/mm3


 


RBC    (3.65-5.03)  M/mm3


 


Hgb    (10.1-14.3)  gm/dl


 


Hct    (30.3-42.9)  %


 


MCV    (79-97)  fl


 


MCH    (28-32)  pg


 


MCHC    (30-34)  %


 


RDW    (13.2-15.2)  %


 


Plt Count    (140-440)  K/mm3


 


Lymph % (Auto)    (13.4-35.0)  %


 


Mono % (Auto)    (0.0-7.3)  %


 


Eos % (Auto)    (0.0-4.3)  %


 


Baso % (Auto)    (0.0-1.8)  %


 


Lymph #    (1.2-5.4)  K/mm3


 


Mono #    (0.0-0.8)  K/mm3


 


Eos #    (0.0-0.4)  K/mm3


 


Baso #    (0.0-0.1)  K/mm3


 


Seg Neutrophils %    (40.0-70.0)  %


 


Seg Neutrophils #    (1.8-7.7)  K/mm3


 


D-Dimer    (0-234)  ng/mlDDU


 


Sodium  142   (137-145)  mmol/L


 


Potassium  4.8   (3.6-5.0)  mmol/L


 


Chloride  104.2   ()  mmol/L


 


Carbon Dioxide  20 L   (22-30)  mmol/L


 


Anion Gap  23   mmol/L


 


BUN  12   (7-17)  mg/dL


 


Creatinine  0.6   (0.6-1.2)  mg/dL


 


Estimated GFR  > 60   ml/min


 


BUN/Creatinine Ratio  20   %


 


Glucose  83   ()  mg/dL


 


Calcium  9.3   (8.4-10.2)  mg/dL


 


Total Bilirubin  0.30   (0.1-1.2)  mg/dL


 


AST  31   (5-40)  units/L


 


ALT  16   (7-56)  units/L


 


Alkaline Phosphatase  82   ()  units/L


 


Troponin T  < 0.010   (0.00-0.029)  ng/mL


 


C-Reactive Protein  0.30   (0.00-1.30)  mg/dL


 


Total Protein  7.3   (6.3-8.2)  g/dL


 


Albumin  3.7 L   (3.9-5)  g/dL


 


Albumin/Globulin Ratio  1.0   %


 


HCG, Qual   Negative  (Negative)  


 


Urine Color    (Yellow)  


 


Urine Turbidity    (Clear)  


 


Urine pH    (5.0-7.0)  


 


Ur Specific Gravity    (1.003-1.030)  


 


Urine Protein    (Negative)  mg/dL


 


Urine Glucose (UA)    (Negative)  mg/dL


 


Urine Ketones    (Negative)  mg/dL


 


Urine Blood    (Negative)  


 


Urine Nitrite    (Negative)  


 


Urine Bilirubin    (Negative)  


 


Urine Urobilinogen    (<2.0)  mg/dL


 


Ur Leukocyte Esterase    (Negative)  


 


Urine WBC (Auto)    (0.0-6.0)  /HPF


 


Urine RBC (Auto)    (0.0-6.0)  /HPF


 


U Epithel Cells (Auto)    (0-13.0)  /HPF


 


Urine Mucus    /HPF


 


Urine HCG, Qual    (Negative)  














- EKG Data


EKG shows normal: sinus rhythm, intervals, QRS complexes, ST-T waves


Rate: normal





- EKG Data





20 16:32


LAD


no STEMI


no acute signs of ischemia





- Radiology Data


Radiology results: report reviewed





CTA chest with contrast 





INDICATION : pleuritic pain, SOB. 





TECHNIQUE: Axial imaging performed through the chest, with contrast bolus timing

set to maximize 


 opacification of the pulmonary arteries. 3-plane MIP reformatted images were 

obtained. All CT scans


 at this location are performed using CT dose reduction for ALARA by means of 

automated exposure 


 control. 





100 mL of intravenous contrast administered. 





COMPARISON: CT chest from 2011 





FINDINGS: 





Bolus: Contrast bolus timing is adequate. 


PTE: No filling defect is present to suggest PTE. 


Mediastinum: Heart and great vessels appear normal. No pathologic mediastinal 

adenopathy. 


Lungs: Lungs are clear. 





Upper abdomen: Limited imaging of the upper abdomen shows nothing acute. 


Bones: Degenerative changes in the spine with nothing acute. 





IMPRESSION: Negative for PTE. Clear lungs. 





Signer Name: Homer Flynn MD 


Signed: 2020 4:09 PM 


Workstation Name: [id number] 








 Transcribed By: KAREL 


 Dictated By: Homer Flynn MD 


 Electronically Authenticated By: Homer Flynn MD 


 Signed Date/Time: 20 1609 











 DD/DT: 20 1607 


 TD/TT: 





- Medical Decision Making





Patient is a 44-year-old female presents emergency room with complaints of right

upper back pain that began 5 days ago.  Patient states that she feels 

significant pain when she takes a deep breath in.  States that when she lays on 

that side the pain increases.  She states that occasionally she does feel short 

of breath.  She denies any fall or injury.  She denies any chest pain, fever, 

nausea, vomiting, diarrhea, leg swelling.  She denies any recent travel, recent 

surgery, immobilization, hormone use.  She has a past medical history of 

hypertension, migraines, nephrolithiasis.  No allergies to medications. VSS. 

labs are normal. troponin is negative. UA is WNL. EKG with LAD otherwise normal.

CT angio chest ordered to r/o PE/aortic dissection/PNA: IMPRESSION: Negative for

PTE. Clear lungs. symptoms appear most consistent with pleuritis. pt given 

decadron and ibuprofen while in the ED. given prescription for naproxen, 

robaxin, and prednisone. advised pt Please take medication as prescribed.  Do 

not drive, operate heavy machinery, or work while taking muscle relaxer 

(robaxin) due to potential for drowsiness.  Follow-up with a primary care 

doctor.  Return to emergency room for any new or worsening symptoms.





- Differential Diagnosis


PE, pleurisy, pleural effusion, CHF, ACS, aortic dissection, muscle strain 


Critical care attestation.: 


If time is entered above; I have spent that time in minutes in the direct care 

of this critically ill patient, excluding procedure time.








ED Disposition


Clinical Impression: 


 Upper back pain on right side, Pleurisy





Disposition: DC- TO HOME OR SELFCARE


Is pt being admited?: No


Does the pt Need Aspirin: No


Condition: Stable


Instructions:  Pleurisy (ED), Muscle Strain (ED)


Additional Instructions: 


Please take medication as prescribed.  Do not drive, operate heavy machinery, or

work while taking muscle relaxer (robaxin) due to potential for drowsiness.  

Follow-up with a primary care doctor.  Return to emergency room for any new or 

worsening symptoms.


Prescriptions: 


Naproxen [EC-Naproxen] 500 mg PO BID PRN #20 tablet.


 PRN Reason: pain


Prednisone [predniSONE 10 mg (6-Day Pack, 21 Tabs)] 10 mg PO .TAPER #1 tab.ds.pk


methOCARBAMOL [Robaxin TAB] 500 mg PO BID PRN #14 tab


 PRN Reason: pain


Referrals: 


[facility] [Primary Care Provider] - 2-3 Days


Time of Disposition: 16:29


Print Language: ENGLISH

## 2020-09-11 NOTE — CAT SCAN REPORT
CTA chest with contrast



INDICATION : pleuritic pain, SOB.



TECHNIQUE:  Axial imaging performed through the chest, with contrast bolus timing set to maximize opa
cification of the pulmonary arteries. 3-plane MIP reformatted images were obtained.  All CT scans at 
this location are performed using CT dose reduction for ALARA by means of automated exposure control.
 



100 mL of intravenous contrast administered.



COMPARISON:  CT chest from 6/13/2011



FINDINGS:  



Bolus:  Contrast bolus timing is adequate.

PTE:  No filling defect is present to suggest PTE.

Mediastinum:  Heart and great vessels appear normal.  No pathologic mediastinal adenopathy.

Lungs:  Lungs are clear.



Upper abdomen:  Limited imaging of the upper abdomen shows nothing acute.

Bones:  Degenerative changes in the spine with nothing acute.



IMPRESSION: Negative for PTE.  Clear lungs.



Signer Name: Homer Flynn MD 

Signed: 9/11/2020 4:09 PM

Workstation Name: [id number]

## 2020-10-07 ENCOUNTER — HOSPITAL ENCOUNTER (EMERGENCY)
Dept: HOSPITAL 5 - ED | Age: 45
Discharge: HOME | End: 2020-10-07
Payer: COMMERCIAL

## 2020-10-07 VITALS — DIASTOLIC BLOOD PRESSURE: 88 MMHG | SYSTOLIC BLOOD PRESSURE: 141 MMHG

## 2020-10-07 DIAGNOSIS — Z79.82: ICD-10-CM

## 2020-10-07 DIAGNOSIS — Z98.51: ICD-10-CM

## 2020-10-07 DIAGNOSIS — Z90.49: ICD-10-CM

## 2020-10-07 DIAGNOSIS — N20.0: ICD-10-CM

## 2020-10-07 DIAGNOSIS — N95.0: Primary | ICD-10-CM

## 2020-10-07 DIAGNOSIS — G43.909: ICD-10-CM

## 2020-10-07 DIAGNOSIS — Z98.890: ICD-10-CM

## 2020-10-07 DIAGNOSIS — Z79.899: ICD-10-CM

## 2020-10-07 LAB
BACTERIA #/AREA URNS HPF: (no result) /HPF
BASOPHILS # (AUTO): 0 K/MM3 (ref 0–0.1)
BASOPHILS NFR BLD AUTO: 0.4 % (ref 0–1.8)
BILIRUB UR QL STRIP: (no result)
BLOOD UR QL VISUAL: (no result)
EOSINOPHIL # BLD AUTO: 0.1 K/MM3 (ref 0–0.4)
EOSINOPHIL NFR BLD AUTO: 1.5 % (ref 0–4.3)
HCT VFR BLD CALC: 35 % (ref 30.3–42.9)
HGB BLD-MCNC: 11.5 GM/DL (ref 10.1–14.3)
LYMPHOCYTES # BLD AUTO: 1.6 K/MM3 (ref 1.2–5.4)
LYMPHOCYTES NFR BLD AUTO: 25.2 % (ref 13.4–35)
MCHC RBC AUTO-ENTMCNC: 33 % (ref 30–34)
MCV RBC AUTO: 86 FL (ref 79–97)
MONOCYTES # (AUTO): 0.5 K/MM3 (ref 0–0.8)
MONOCYTES % (AUTO): 8.7 % (ref 0–7.3)
MUCOUS THREADS #/AREA URNS HPF: (no result) /HPF
PH UR STRIP: 5 [PH] (ref 5–7)
PLATELET # BLD: 223 K/MM3 (ref 140–440)
RBC # BLD AUTO: 4.07 M/MM3 (ref 3.65–5.03)
RBC #/AREA URNS HPF: 2 /HPF (ref 0–6)
UROBILINOGEN UR-MCNC: 4 MG/DL (ref ?–2)
WBC #/AREA URNS HPF: < 1 /HPF (ref 0–6)

## 2020-10-07 PROCEDURE — 36415 COLL VENOUS BLD VENIPUNCTURE: CPT

## 2020-10-07 PROCEDURE — 81001 URINALYSIS AUTO W/SCOPE: CPT

## 2020-10-07 PROCEDURE — 99283 EMERGENCY DEPT VISIT LOW MDM: CPT

## 2020-10-07 PROCEDURE — 85025 COMPLETE CBC W/AUTO DIFF WBC: CPT

## 2020-10-07 PROCEDURE — 81025 URINE PREGNANCY TEST: CPT

## 2020-10-07 NOTE — EMERGENCY DEPARTMENT REPORT
ED Female  HPI





- General


Chief complaint: Vaginal Bleeding


Stated complaint: BLEEDING HEAVY


Time Seen by Provider: 10/07/20 11:25


Source: patient


Mode of arrival: Ambulatory


Limitations: No Limitations





- History of Present Illness


Initial comments: 





44-year-old  female presents to the emergency room complaining of 

vaginal bleeding.  Patient reports that she has not had a normal menstrual 

period in the last 2 years.  Patient states that she started bleeding 2020 

and bled for 39 days straight.  Patient states at that time her primary care 

provider place her on Provera x2 equals 14 days and then was placed on birth 

control.  Patient states that the bleeding has stopped the proximal around 

August 10.  Patient states that this morning at 2 AM she started having some 

pelvic cramping and bleeding that she noticed was brownish.  As she laid back 

down and woke up and about 2 hours she had saturated a full panty liner.  

Patient states then she placed a super tampon in with a long maxi pad and within

2 hours she had soaked through.  Patient states that she was returning back from

taking her daughter to work she felt a gush of fluids in her vaginal area and 

has soaked through tampon and pad and bled through to her seats in her car.  

Patient states that she denies lightheadedness any shortness of breath no chest 

pain but does feel a little tired.  Patient reports that she is  11 para 

7 with 1 stillborn and 3 miscarriages.  Patient does not have a OB/GYN.  Patient

denies any past medical history currently takes no medications on a daily basis 

and has no known drug allergies.  She does report the cramping is still present 

but the bleeding has decreased since being here in the ER.


MD Complaint: vaginal bleeding





- Related Data


                                Home Medications











 Medication  Instructions  Recorded  Confirmed  Last Taken


 


Aspirin [Aspirin BABY CHEW TAB] 81 mg PO DAILY 20 1 Day Ago





    ~20


 


Atorvastatin [Lipitor Tab] 40 mg PO DAILY 20 1 Day Ago





    ~20


 


Losartan Potassium 50 mg PO DAILY 20 1 Day Ago





    ~20








                                  Previous Rx's











 Medication  Instructions  Recorded  Last Taken  Type


 


Butalb/Acetaminophen/Caffeine 1 cap PO Q6HR PRN #10 cap 20 1 Day Ago Rx





[Fioricet -40 mg CAP]   ~20 


 


Naproxen [EC-Naproxen] 500 mg PO BID PRN #20 tablet. 20 Unknown Rx


 


Prednisone [predniSONE 10 mg 10 mg PO .TAPER #1 tab.ds.pk 20 Unknown Rx





(6-Day Pack, 21 Tabs)]    


 


methOCARBAMOL [Robaxin TAB] 500 mg PO BID PRN #14 tab 20 Unknown Rx











                                    Allergies











Allergy/AdvReac Type Severity Reaction Status Date / Time


 


No Known Allergies Allergy   Verified 20 09:47














ED Review of Systems


ROS: 


Stated complaint: BLEEDING HEAVY


Other details as noted in HPI








ED Past Medical Hx





- Past Medical History


Previous Medical History?: Yes


Hx Hypertension: No


Hx Heart Attack/AMI: No


Hx Congestive Heart Failure: No


Hx Diabetes: No


Hx Renal Disease: Yes (kidney stones)


Hx Headaches / Migraines: Yes (MIGRAINES)


Hx Kidney Stones: Yes


Hx Asthma: No


Hx COPD: No


Additional medical history: As far as I can tell there were no stones found on 

retrograde urethrogram prior to left ureteral stent.  There was some edema 

noted.  There are no stones in the left kidney per prior CT examination.





- Surgical History


Past Surgical History?: Yes


Hx Cholecystectomy: Yes


Additional Surgical History: TUBAL LIGATION AND REVERSAL.  .  KIDNEY 

STONE REMOVED.  urethral stents





- Social History


Smoking Status: Never Smoker


Substance Use Type: None





- Medications


Home Medications: 


                                Home Medications











 Medication  Instructions  Recorded  Confirmed  Last Taken  Type


 


Butalb/Acetaminophen/Caffeine 1 cap PO Q6HR PRN #10 cap 20 1 Day 

Ago Rx





[Fioricet -40 mg CAP]    ~20 


 


Aspirin [Aspirin BABY CHEW TAB] 81 mg PO DAILY 20 1 Day Ago 

History





    ~20 


 


Atorvastatin [Lipitor Tab] 40 mg PO DAILY 20 1 Day Ago History





    ~20 


 


Losartan Potassium 50 mg PO DAILY 20 1 Day Ago History





    ~20 


 


Naproxen [EC-Naproxen] 500 mg PO BID PRN #20 tablet. 20  Unknown Rx


 


Prednisone [predniSONE 10 mg 10 mg PO .TAPER #1 tab.dsStephaniepk 20  Unknown Rx





(6-Day Pack, 21 Tabs)]     


 


methOCARBAMOL [Robaxin TAB] 500 mg PO BID PRN #14 tab 20  Unknown Rx














ED Physical Exam





- General


Limitations: No Limitations


General appearance: alert, in no apparent distress





- Head


Head exam: Present: atraumatic, normocephalic





- Eye


Eye exam: Present: normal appearance





- ENT


ENT exam: Present: normal exam





- Neck


Neck exam: Present: normal inspection, full ROM





- Respiratory


Respiratory exam: Present: normal lung sounds bilaterally





- Cardiovascular


Cardiovascular Exam: Present: regular rate





- GI/Abdominal


GI/Abdominal exam: Present: soft.  Absent: distended, tenderness, guarding





- Extremities Exam


Extremities exam: Present: normal inspection, full ROM





- Back Exam


Back exam: Present: normal inspection





- Neurological Exam


Neurological exam: Present: alert, oriented X3, normal gait





- Psychiatric


Psychiatric exam: Present: normal affect, normal mood





- Skin


Skin exam: Present: warm, dry, intact, normal color.  Absent: rash





ED Course


                                   Vital Signs











  10/07/20





  11:30


 


Temperature 98 F


 


Pulse Rate 85


 


Respiratory 16





Rate 


 


Blood Pressure 141/88


 


O2 Sat by Pulse 96





Oximetry 














ED Medical Decision Making





- Lab Data


Result diagrams: 


                                 10/07/20 12:53











                                Laboratory Tests











  10/07/20 10/07/20





  12:53 16:07


 


WBC  6.3 


 


RBC  4.07 


 


Hgb  11.5 


 


Hct  35.0 


 


MCV  86 


 


MCH  28 


 


MCHC  33 


 


RDW  15.0 


 


Plt Count  223 


 


Lymph % (Auto)  25.2 


 


Mono % (Auto)  8.7 H 


 


Eos % (Auto)  1.5 


 


Baso % (Auto)  0.4 


 


Lymph # (Auto)  1.6 


 


Mono # (Auto)  0.5 


 


Eos # (Auto)  0.1 


 


Baso # (Auto)  0.0 


 


Seg Neutrophils %  64.2 


 


Seg Neutrophils #  4.1 


 


Urine Color   Karolina


 


Urine Turbidity   Clear


 


Urine pH   5.0


 


Ur Specific Gravity   1.033 H


 


Urine Protein   30 mg/dl


 


Urine Glucose (UA)   Neg


 


Urine Ketones   Neg


 


Urine Blood   Neg


 


Urine Nitrite   Neg


 


Ur Reducing Substances   Not Reportable


 


Urine Bilirubin   Neg


 


Urine Ictotest   Not Reportable


 


Urine Urobilinogen   4.0


 


Ur Leukocyte Esterase   Neg


 


Urine WBC (Auto)   < 1.0


 


Urine RBC (Auto)   2.0


 


U Epithel Cells (Auto)   < 1.0


 


Urine Bacteria (Auto)   1+


 


Urine Mucus   3+


 


Urine HCG, Qual   Negative














- Medical Decision Making





44-year-old  female presents to the emergency room complaining of 

vaginal bleeding.  Patient reports that she has not had a normal menstrual 

period in the last 2 years.  Patient states that she started bleeding 2020 

and bled for 39 days straight.  Patient states at that time her primary care 

provider place her on Provera x2 equals 14 days and then was placed on birth 

control.  Patient states that the bleeding has stopped the proximal around 

August 10.  Patient states that this morning at 2 AM she started having some 

pelvic cramping and bleeding that she noticed was brownish.  As she laid back 

down and woke up and about 2 hours she had saturated a full panty liner.  

Patient states then she placed a super tampon in with a long maxi pad and within

2 hours she had soaked through.  Patient states that she was returning back from

taking her daughter to work she felt a gush of fluids in her vaginal area and 

has soaked through tampon and pad and bled through to her seats in her car.  

Patient states that she denies lightheadedness any shortness of breath no chest 

pain but does feel a little tired.  Patient reports that she is  11 para 

7 with 1 stillborn and 3 miscarriages.  Patient does not have a OB/GYN.  Patient

denies any past medical history currently takes no medications on a daily basis 

and has no known drug allergies.  She does report the cramping is still present 

but the bleeding has decreased since being here in the ER.








CBC is within normal limits no signs of acute blood loss.  Urinalysis and urine 

pregnancy test is pending.








Urinalysis is negative for any acute infection pregnancy test is negative.  Jose

mmend patient to follow-up with an OB/GYN I have listed several below in her 

discharge summary.  Patient can take ibuprofen, naproxen for pelvic cramping.





- Differential Diagnosis


Fibroids, pregnancy, intrauterine cancer


Critical care attestation.: 


If time is entered above; I have spent that time in minutes in the direct care 

of this critically ill patient, excluding procedure time.








ED Disposition


Clinical Impression: 


 Menorrhagia, Postmenopausal bleeding





Disposition: DC-01 TO HOME OR SELFCARE


Is pt being admited?: No


Does the pt Need Aspirin: No


Condition: Stable


Instructions:  Menorrhagia (ED)


Additional Instructions: 


Your labs show that you are not anemic.  I would like you to follow-up with an 

OB/GYN as this is important for postmenopausal women to have vaginal bleeding.  

I have listed several below for your convenience.  You can take ibuprofen or 

Tylenol or even naproxen for your pelvic cramping.


Referrals: 


LUPILLO OCHOA III, APRN-BC [Primary Care Provider] - 3-5 Days


[facility] [Provider Group] - 3-5 Days


[facility] [Provider Group] - 3-5 Days


[facility] [Provider Group] - 3-5 Days


Forms:  Work/School Release Form(ED)

## 2020-10-07 NOTE — EMERGENCY DEPARTMENT REPORT
Blank Doc





- Documentation


Documentation: 





44-year-old female that presents with vaginal bleeding and tiredness/fatigue.  

Stated goes about since 9 am has went by 4 tampons and 4 pads.





This initial assessment/diagnostic orders/clinical plan/treatment(s) is/are 

subject to change based on patient's health status, clinical progression and re-

assessment by fellow clinical providers in the ED.  Further treatment and workup

at subsequent clinical providers discretion.  Patient/guardians urged not to 

elope from the ED as their condition may be serious if not clinically assessed 

and managed.  Initial orders include:


1- Patient sent to [facility] for further evaluation and treatment


2- labs


3- UA

## 2021-05-08 ENCOUNTER — HOSPITAL ENCOUNTER (EMERGENCY)
Dept: HOSPITAL 5 - ED | Age: 46
Discharge: HOME | End: 2021-05-08
Payer: COMMERCIAL

## 2021-05-08 VITALS — SYSTOLIC BLOOD PRESSURE: 130 MMHG | DIASTOLIC BLOOD PRESSURE: 72 MMHG

## 2021-05-08 DIAGNOSIS — G43.909: ICD-10-CM

## 2021-05-08 DIAGNOSIS — Z98.890: ICD-10-CM

## 2021-05-08 DIAGNOSIS — Z90.49: ICD-10-CM

## 2021-05-08 DIAGNOSIS — N93.8: ICD-10-CM

## 2021-05-08 DIAGNOSIS — Z79.899: ICD-10-CM

## 2021-05-08 DIAGNOSIS — Z98.51: ICD-10-CM

## 2021-05-08 DIAGNOSIS — N20.0: ICD-10-CM

## 2021-05-08 DIAGNOSIS — N28.1: Primary | ICD-10-CM

## 2021-05-08 DIAGNOSIS — R10.9: ICD-10-CM

## 2021-05-08 LAB
BASOPHILS # (AUTO): 0 K/MM3 (ref 0–0.1)
BASOPHILS NFR BLD AUTO: 0.5 % (ref 0–1.8)
BILIRUB UR QL STRIP: (no result)
BLOOD UR QL VISUAL: (no result)
EOSINOPHIL # BLD AUTO: 0.1 K/MM3 (ref 0–0.4)
EOSINOPHIL NFR BLD AUTO: 1.4 % (ref 0–4.3)
HCT VFR BLD CALC: 35.3 % (ref 30.3–42.9)
HGB BLD-MCNC: 11.5 GM/DL (ref 10.1–14.3)
LYMPHOCYTES # BLD AUTO: 1.7 K/MM3 (ref 1.2–5.4)
LYMPHOCYTES NFR BLD AUTO: 27.4 % (ref 13.4–35)
MCHC RBC AUTO-ENTMCNC: 33 % (ref 30–34)
MCV RBC AUTO: 86 FL (ref 79–97)
MONOCYTES # (AUTO): 0.5 K/MM3 (ref 0–0.8)
MONOCYTES % (AUTO): 8.5 % (ref 0–7.3)
MUCOUS THREADS #/AREA URNS HPF: (no result) /HPF
PH UR STRIP: 5 [PH] (ref 5–7)
PLATELET # BLD: 210 K/MM3 (ref 140–440)
RBC # BLD AUTO: 4.12 M/MM3 (ref 3.65–5.03)
RBC #/AREA URNS HPF: > 182 /HPF (ref 0–6)
UROBILINOGEN UR-MCNC: < 2 MG/DL (ref ?–2)
WBC #/AREA URNS HPF: 4 /HPF (ref 0–6)

## 2021-05-08 PROCEDURE — 86900 BLOOD TYPING SEROLOGIC ABO: CPT

## 2021-05-08 PROCEDURE — 96374 THER/PROPH/DIAG INJ IV PUSH: CPT

## 2021-05-08 PROCEDURE — 84702 CHORIONIC GONADOTROPIN TEST: CPT

## 2021-05-08 PROCEDURE — 81001 URINALYSIS AUTO W/SCOPE: CPT

## 2021-05-08 PROCEDURE — 86901 BLOOD TYPING SEROLOGIC RH(D): CPT

## 2021-05-08 PROCEDURE — 96361 HYDRATE IV INFUSION ADD-ON: CPT

## 2021-05-08 PROCEDURE — 74176 CT ABD & PELVIS W/O CONTRAST: CPT

## 2021-05-08 PROCEDURE — 36415 COLL VENOUS BLD VENIPUNCTURE: CPT

## 2021-05-08 PROCEDURE — 85025 COMPLETE CBC W/AUTO DIFF WBC: CPT

## 2021-05-08 PROCEDURE — 99284 EMERGENCY DEPT VISIT MOD MDM: CPT

## 2021-05-08 NOTE — CAT SCAN REPORT
CT ABDOMEN AND PELVIS WITHOUT CONTRAST



INDICATION / CLINICAL INFORMATION:

flank pain.



TECHNIQUE:

Axial CT images were obtained through the abdomen and pelvis without IV contrast.  All CT scans at [facility] location are performed using CT dose reduction for ALARA by means of automated exposure control. 



COMPARISON:

5/12/2017



FINDINGS:



LOWER CHEST: No significant abnormality.

HEPATOBILIARY: Mild hepatomegaly at 18.5 cm without focal hepatic lesion. No significant biliary duct
al dilatation.

PANCREAS/SPLEEN/ADRENALS: No significant abnormality.

GENITOURINARY: Right-sided nonobstructing nephrolithiasis (approximately 4 stones) measuring up to 5 
mm. 4.6 cm right renal cyst at the interpolar region. No obstructive uropathy. A tampon is present. T
he bladder is decompressed limiting evaluation. No significant abnormality of the ureters.

GASTROINTESTINAL/MESENTERY: Amount of stool throughout the colon suggests mild constipation. No evide
nce of acute appendicitis. No bowel obstruction or inflammation. No free air or significant free flui
d.

RETROPERITONEUM: No significant adenopathy.

REPRODUCTIVE ORGANS: No significant abnormality.



VASCULAR: Mild atherosclerotic calcification without acute abnormality. 

BODY WALL: No significant abnormality.

SKELETAL SYSTEM: No significant abnormality.



IMPRESSION:

1. Mild right-sided nonobstructing nephrolithiasis. No evidence of obstructive uropathy.

2. 4.6 cm right simple renal cyst.

3. Mild hepatomegaly.



Signer Name: Stan Ojeda MD 

Signed: 5/8/2021 3:18 PM

Workstation Name: [id number]

## 2021-05-08 NOTE — EMERGENCY DEPARTMENT REPORT
ED Female  HPI





- General


Chief complaint: Vaginal Bleeding


Stated complaint: VAGINAL BLEEDING


Time Seen by Provider: 21 12:22


Source: patient


Mode of arrival: Ambulatory


Limitations: No Limitations





- History of Present Illness


Initial comments: 





This is a 45-year-old female with no prior medical history presents ED 

complaining of vaginal bleeding x2 weeks and left-sided flank pain x1 week.  

Patient states that she has not had a menstrual cycle for the past 7 months and 

2 weeks ago she started having vaginal bleeding.  Patient states she has been 

having heavy bleeding with blood clots for the past 2 weeks just not resolving. 

Patient states that she has had a bit of fatigue but no other symptoms.  Patient

states that last week she started to have left-sided flank pain that is 

worsening.  Patient states that she does have a history of kidney stones but has

has it removed in the past.


Patient states that she has had similar vaginal bleeding episode last year which

she followed up with OB/GYN and all her test was normal.  Patient states she has

not been experiencing anyDizziness, fatigue, headaches or any symptoms


MD Complaint: vaginal bleeding


-: Gradual, week(s) (2)





- Related Data


                                Home Medications











 Medication  Instructions  Recorded  Confirmed  Last Taken


 


Aspirin [Aspirin BABY CHEW TAB] 81 mg PO DAILY 20 1 Day Ago





    ~20


 


Atorvastatin [Lipitor Tab] 40 mg PO DAILY 20 1 Day Ago





    ~20


 


Losartan Potassium 50 mg PO DAILY 20 1 Day Ago





    ~20








                                  Previous Rx's











 Medication  Instructions  Recorded  Last Taken  Type


 


Butalb/Acetaminophen/Caffeine 1 cap PO Q6HR PRN #10 cap 20 1 Day Ago Rx





[Fioricet -40 mg CAP]   ~20 


 


Prednisone [predniSONE 10 mg 10 mg PO .TAPER #1 tab.ds.pk 20 Unknown Rx





(6-Day Pack, 21 Tabs)]    


 


methOCARBAMOL [Robaxin TAB] 500 mg PO BID PRN #14 tab 20 Unknown Rx


 


Naproxen [EC-Naproxen] 500 mg PO BID PRN #30 tablet.dr 21 Unknown Rx


 


medroxyPROGESTERone ACETATE 10 mg PO DAILY 10 Days #10 tablet 21 Unknown 

Rx





[Provera]    


 


traMADoL [Ultram 50 MG tab] 50 mg PO Q6HR PRN #20 tablet 21 Unknown Rx











                                    Allergies











Allergy/AdvReac Type Severity Reaction Status Date / Time


 


No Known Allergies Allergy   Verified 20 09:47














ED Review of Systems


ROS: 


Stated complaint: VAGINAL BLEEDING


Other details as noted in HPI





Comment: All other systems reviewed and negative





ED Past Medical Hx





- Past Medical History


Previous Medical History?: Yes


Hx Hypertension: No


Hx Heart Attack/AMI: No


Hx Congestive Heart Failure: No


Hx Diabetes: No


Hx Renal Disease: Yes (kidney stones)


Hx Headaches / Migraines: Yes (MIGRAINES)


Hx Kidney Stones: Yes


Hx Asthma: No


Hx COPD: No


Additional medical history: As far as I can tell there were no stones found on 

retrograde urethrogram prior to left ureteral stent.  There was some edema 

noted.  There are no stones in the left kidney per prior CT examination.





- Surgical History


Past Surgical History?: Yes


Hx Cholecystectomy: Yes


Additional Surgical History: TUBAL LIGATION AND REVERSAL.  .  KIDNEY 

STONE REMOVED.  urethral stents





- Social History


Smoking Status: Never Smoker


Substance Use Type: None





- Medications


Home Medications: 


                                Home Medications











 Medication  Instructions  Recorded  Confirmed  Last Taken  Type


 


Butalb/Acetaminophen/Caffeine 1 cap PO Q6HR PRN #10 cap 20 1 Day 

Ago Rx





[Fioricet -40 mg CAP]    ~20 


 


Aspirin [Aspirin BABY CHEW TAB] 81 mg PO DAILY 20 1 Day Ago Histo

ry





    ~20 


 


Atorvastatin [Lipitor Tab] 40 mg PO DAILY 20 1 Day Ago History





    ~20 


 


Losartan Potassium 50 mg PO DAILY 20 1 Day Ago History





    ~20 


 


Prednisone [predniSONE 10 mg 10 mg PO .TAPER #1 tab.ds.pk 20  Unknown Rx





(6-Day Pack, 21 Tabs)]     


 


methOCARBAMOL [Robaxin TAB] 500 mg PO BID PRN #14 tab 20  Unknown Rx


 


Naproxen [EC-Naproxen] 500 mg PO BID PRN #30 tablet. 21  Unknown Rx


 


medroxyPROGESTERone ACETATE 10 mg PO DAILY 10 Days #10 tablet 21  Unknown 

Rx





[Provera]     


 


traMADoL [Ultram 50 MG tab] 50 mg PO Q6HR PRN #20 tablet 21  Unknown Rx














ED Physical Exam





- General


Limitations: No Limitations


General appearance: alert, in no apparent distress





- Head


Head exam: Present: atraumatic, normocephalic





- Eye


Eye exam: Present: normal appearance





- ENT


ENT exam: Present: mucous membranes moist





- Neck


Neck exam: Present: normal inspection





- Respiratory


Respiratory exam: Present: normal lung sounds bilaterally.  Absent: respiratory 

distress





- Cardiovascular


Cardiovascular Exam: Present: regular rate, normal rhythm.  Absent: systolic 

murmur, diastolic murmur, rubs, gallop





- GI/Abdominal


GI/Abdominal exam: Present: soft, normal bowel sounds





- Extremities Exam


Extremities exam: Present: normal inspection





- Back Exam


Back exam: Present: normal inspection, full ROM, CVA tenderness (L).  Absent: 

CVA tenderness (R)





- Neurological Exam


Neurological exam: Present: alert, oriented X3, normal gait





- Psychiatric


Psychiatric exam: Present: normal affect, normal mood





- Skin


Skin exam: Present: warm, dry, intact, normal color.  Absent: rash





ED Course


                                   Vital Signs











  21





  11:12


 


Temperature 98.4 F


 


Pulse Rate 72


 


Respiratory 15





Rate 


 


Blood Pressure 151/87


 


O2 Sat by Pulse 98





Oximetry 














ED Medical Decision Making





- Lab Data


Result diagrams: 


                                 21 13:05











                             Laboratory Last Values











WBC  6.2 K/mm3 (4.5-11.0)   21  13:05    


 


RBC  4.12 M/mm3 (3.65-5.03)   21  13:05    


 


Hgb  11.5 gm/dl (10.1-14.3)   21  13:05    


 


Hct  35.3 % (30.3-42.9)   21  13:05    


 


MCV  86 fl (79-97)   21  13:05    


 


MCH  28 pg (28-32)   21  13:05    


 


MCHC  33 % (30-34)   21  13:05    


 


RDW  15.8 % (13.2-15.2)  H  21  13:05    


 


Plt Count  210 K/mm3 (140-440)   21  13:05    


 


Lymph % (Auto)  27.4 % (13.4-35.0)   21  13:05    


 


Mono % (Auto)  8.5 % (0.0-7.3)  H  21  13:05    


 


Eos % (Auto)  1.4 % (0.0-4.3)   21  13:05    


 


Baso % (Auto)  0.5 % (0.0-1.8)   21  13:05    


 


Lymph # (Auto)  1.7 K/mm3 (1.2-5.4)   21  13:05    


 


Mono # (Auto)  0.5 K/mm3 (0.0-0.8)   21  13:05    


 


Eos # (Auto)  0.1 K/mm3 (0.0-0.4)   21  13:05    


 


Baso # (Auto)  0.0 K/mm3 (0.0-0.1)   21  13:05    


 


Seg Neutrophils %  62.2 % (40.0-70.0)   21  13:05    


 


Seg Neutrophils #  3.9 K/mm3 (1.8-7.7)   21  13:05    


 


HCG, Quant  0.947 mIU/mL (0-4)   21  13:05    


 


Urine Color  Red  (Yellow)   21  12:48    


 


Urine Turbidity  Slightly-cloudy  (Clear)   21  12:48    


 


Urine pH  5.0  (5.0-7.0)   21  12:48    


 


Ur Specific Gravity  1.027  (1.003-1.030)   21  12:48    


 


Urine Protein  100 mg/dl mg/dL (Negative)   21  12:48    


 


Urine Glucose (UA)  Neg mg/dL (Negative)   21  12:48    


 


Urine Ketones  Neg mg/dL (Negative)   21  12:48    


 


Urine Blood  Lg  (Negative)   21  12:48    


 


Urine Nitrite  Neg  (Negative)   21  12:48    


 


Urine Bilirubin  Neg  (Negative)   21  12:48    


 


Urine Urobilinogen  < 2.0 mg/dL (<2.0)   21  12:48    


 


Ur Leukocyte Esterase  Neg  (Negative)   21  12:48    


 


Urine WBC (Auto)  4.0 /HPF (0.0-6.0)   21  12:48    


 


Urine RBC (Auto)  > 182.0 /HPF (0.0-6.0)   21  12:48    


 


Urine Mucus  3+ /HPF  21  12:48    


 


Blood Type  O POSITIVE   21  13:05    














- Radiology Data


Radiology results: report reviewed, image reviewed





CT ABDOMEN AND PELVIS WITHOUT CONTRAST INDICATION / CLINICAL INFORMATION: flank 

pain. TECHNIQUE: Axial CT images were obtained through the abdomen and pelvis w

ithout IV contrast. All CT scans at this location are performed using CT dose 

reduction for ALARA by means of automated exposure control. COMPARISON: 

2017 FINDINGS: LOWER CHEST: No significant abnormality. HEPATOBILIARY: Mild

hepatomegaly at 18.5 cm without focal hepatic lesion. No significant biliary 

ductal dilatation. PANCREAS/SPLEEN/ADRENALS: No significant abnormality. 

GENITOURINARY: Right-sided nonobstructing nephrolithiasis (approximately 4 

stones) measuring up to 5 mm. 4.6 cm right renal cyst at the interpolar region. 

No obstructive uropathy. A tampon is present. The bladder is decompressed 

limiting evaluation. No significant abnormality of the ureters. 

GASTROINTESTINAL/MESENTERY: Amount of stool throughout the colon suggests mild 

constipation. No evidence of acute appendicitis. No bowel obstruction or 

inflammation. No free air or significant free fluid. RETROPERITONEUM: No s

ignificant adenopathy. REPRODUCTIVE ORGANS: No significant abnormality. 

VASCULAR: Mild atherosclerotic calcification without acute abnormality. BODY 

WALL: No significant abnormality. SKELETAL SYSTEM: No significant abnormality. 

IMPRESSION: 1. Mild right-sided nonobstructing nephrolithiasis. No evidence of 

obstructive uropathy. 2. 4.6 cm right simple renal cyst. 3. Mild hepatomegaly. 

Signer Name: Dago Ojeda MD Signed: 2021 3:18 PM Workstation Name: 

[id number] Transcribed By:  Dictated By: DAGO OJEDA III 

Electronically Authenticated By: DAGO OJEDA III Signed Date/Time: 

21 1518 











- Medical Decision Making


This 25-year-old female presents to ED with abnormal uterine bleeding.


H&H within normal limits.


Discussed follow-up with gynecologist for management.


CT scan shows kidney stones and renal cyst.  Discussed findings with the 

patient.


Discussed with the patient to follow-up with GYN,GI as well as nephrology.


Overall patient has a life-threatening illness and pain is controlled with pain 

medication.


Patient was just discharged home with pain medication as well as Provera


Patient was in no acute distress.





Critical care attestation.: 


If time is entered above; I have spent that time in minutes in the direct care 

of this critically ill patient, excluding procedure time.








ED Disposition


Clinical Impression: 


 Nephrolithiasis, Dysfunctional uterine bleeding, Renal cyst, Flank pain





Disposition:  TO HOME OR SELFCARE


Is pt being admited?: No


Does the pt Need Aspirin: No


Condition: Stable


Instructions:  Kidney Stones, Easy-to-Read, Menorrhagia, Easy-to-Read


Additional Instructions: 


Make sure to follow up with the primary care physician as discussed.


Take all your medications as you've been prescribed.


If you have any worsening symptoms or develop new symptoms please return to ED 

immediately.


Prescriptions: 


Naproxen [EC-Naproxen] 500 mg PO BID PRN #30 tablet.dr


 PRN Reason: pain


medroxyPROGESTERone ACETATE [Provera] 10 mg PO DAILY 10 Days #10 tablet


traMADoL [Ultram 50 MG tab] 50 mg PO Q6HR PRN #20 tablet


 PRN Reason: Pain


Referrals: 


PRIMARY CARE,MD [Primary Care Provider] - 3-5 Days


[facility] [Provider Group] - 3-5 Days


[facility] [Provider Group] - 3-5 Days


DAIJA CARR MD [Staff Physician] - 3-5 Days


JERALD ALMARAZ MD [Staff Physician] - 3-5 Days


DALTON HARRIS PA [Physician Assistant] - 3-5 Days


MAI FREEMAN MD [Staff Physician] - 3-5 Days


Forms:  Work/School Release Form(ED)


Time of Disposition: 16:36